# Patient Record
Sex: MALE | Race: WHITE | HISPANIC OR LATINO | Employment: FULL TIME | URBAN - NONMETROPOLITAN AREA
[De-identification: names, ages, dates, MRNs, and addresses within clinical notes are randomized per-mention and may not be internally consistent; named-entity substitution may affect disease eponyms.]

---

## 2024-06-11 ENCOUNTER — APPOINTMENT (OUTPATIENT)
Dept: RADIOLOGY | Facility: HOSPITAL | Age: 44
End: 2024-06-11

## 2024-06-11 ENCOUNTER — HOSPITAL ENCOUNTER (EMERGENCY)
Facility: HOSPITAL | Age: 44
Discharge: OTHER INSTITUTION | End: 2024-06-12
Attending: EMERGENCY MEDICINE

## 2024-06-11 ENCOUNTER — PREP FOR PROCEDURE (OUTPATIENT)
Dept: UROLOGY | Facility: HOSPITAL | Age: 44
End: 2024-06-11

## 2024-06-11 DIAGNOSIS — R10.9 FLANK PAIN: ICD-10-CM

## 2024-06-11 DIAGNOSIS — N13.8 URINARY TRACT OBSTRUCTION BY KIDNEY STONE: Primary | ICD-10-CM

## 2024-06-11 DIAGNOSIS — N30.01 ACUTE CYSTITIS WITH HEMATURIA: ICD-10-CM

## 2024-06-11 DIAGNOSIS — N20.0 URINARY TRACT OBSTRUCTION BY KIDNEY STONE: Primary | ICD-10-CM

## 2024-06-11 DIAGNOSIS — N20.1 LEFT URETERAL STONE: Primary | ICD-10-CM

## 2024-06-11 LAB
ALBUMIN SERPL BCP-MCNC: 4.2 G/DL (ref 3.4–5)
ALP SERPL-CCNC: 83 U/L (ref 33–120)
ALT SERPL W P-5'-P-CCNC: 51 U/L (ref 10–52)
ANION GAP SERPL CALC-SCNC: 11 MMOL/L (ref 10–20)
APPEARANCE UR: ABNORMAL
AST SERPL W P-5'-P-CCNC: 25 U/L (ref 9–39)
BACTERIA #/AREA URNS AUTO: ABNORMAL /HPF
BASOPHILS # BLD AUTO: 0.04 X10*3/UL (ref 0–0.1)
BASOPHILS NFR BLD AUTO: 0.4 %
BILIRUB SERPL-MCNC: 0.8 MG/DL (ref 0–1.2)
BILIRUB UR STRIP.AUTO-MCNC: NEGATIVE MG/DL
BUN SERPL-MCNC: 16 MG/DL (ref 6–23)
CALCIUM SERPL-MCNC: 9.8 MG/DL (ref 8.6–10.3)
CHLORIDE SERPL-SCNC: 104 MMOL/L (ref 98–107)
CO2 SERPL-SCNC: 28 MMOL/L (ref 21–32)
COLOR UR: ABNORMAL
CREAT SERPL-MCNC: 1.22 MG/DL (ref 0.5–1.3)
EGFRCR SERPLBLD CKD-EPI 2021: 76 ML/MIN/1.73M*2
EOSINOPHIL # BLD AUTO: 0.03 X10*3/UL (ref 0–0.7)
EOSINOPHIL NFR BLD AUTO: 0.3 %
ERYTHROCYTE [DISTWIDTH] IN BLOOD BY AUTOMATED COUNT: 12.6 % (ref 11.5–14.5)
GLUCOSE SERPL-MCNC: 124 MG/DL (ref 74–99)
GLUCOSE UR STRIP.AUTO-MCNC: NORMAL MG/DL
HCT VFR BLD AUTO: 43 % (ref 41–52)
HGB BLD-MCNC: 14.2 G/DL (ref 13.5–17.5)
IMM GRANULOCYTES # BLD AUTO: 0.03 X10*3/UL (ref 0–0.7)
IMM GRANULOCYTES NFR BLD AUTO: 0.3 % (ref 0–0.9)
KETONES UR STRIP.AUTO-MCNC: ABNORMAL MG/DL
LACTATE SERPL-SCNC: 0.9 MMOL/L (ref 0.4–2)
LEUKOCYTE ESTERASE UR QL STRIP.AUTO: ABNORMAL
LYMPHOCYTES # BLD AUTO: 0.94 X10*3/UL (ref 1.2–4.8)
LYMPHOCYTES NFR BLD AUTO: 9.6 %
MCH RBC QN AUTO: 29.8 PG (ref 26–34)
MCHC RBC AUTO-ENTMCNC: 33 G/DL (ref 32–36)
MCV RBC AUTO: 90 FL (ref 80–100)
MONOCYTES # BLD AUTO: 0.56 X10*3/UL (ref 0.1–1)
MONOCYTES NFR BLD AUTO: 5.7 %
MUCOUS THREADS #/AREA URNS AUTO: ABNORMAL /LPF
NEUTROPHILS # BLD AUTO: 8.2 X10*3/UL (ref 1.2–7.7)
NEUTROPHILS NFR BLD AUTO: 83.7 %
NITRITE UR QL STRIP.AUTO: NEGATIVE
NRBC BLD-RTO: 0 /100 WBCS (ref 0–0)
PH UR STRIP.AUTO: 6.5 [PH]
PLATELET # BLD AUTO: 175 X10*3/UL (ref 150–450)
POTASSIUM SERPL-SCNC: 3.3 MMOL/L (ref 3.5–5.3)
PROT SERPL-MCNC: 7.7 G/DL (ref 6.4–8.2)
PROT UR STRIP.AUTO-MCNC: ABNORMAL MG/DL
RBC # BLD AUTO: 4.77 X10*6/UL (ref 4.5–5.9)
RBC # UR STRIP.AUTO: ABNORMAL /UL
RBC #/AREA URNS AUTO: >20 /HPF
SODIUM SERPL-SCNC: 140 MMOL/L (ref 136–145)
SP GR UR STRIP.AUTO: 1.03
SQUAMOUS #/AREA URNS AUTO: ABNORMAL /HPF
UROBILINOGEN UR STRIP.AUTO-MCNC: ABNORMAL MG/DL
WBC # BLD AUTO: 9.8 X10*3/UL (ref 4.4–11.3)
WBC #/AREA URNS AUTO: >50 /HPF

## 2024-06-11 PROCEDURE — 99284 EMERGENCY DEPT VISIT MOD MDM: CPT | Mod: 25

## 2024-06-11 PROCEDURE — 96372 THER/PROPH/DIAG INJ SC/IM: CPT | Performed by: EMERGENCY MEDICINE

## 2024-06-11 PROCEDURE — 2550000001 HC RX 255 CONTRASTS: Performed by: PHYSICIAN ASSISTANT

## 2024-06-11 PROCEDURE — 85025 COMPLETE CBC W/AUTO DIFF WBC: CPT | Performed by: PHYSICIAN ASSISTANT

## 2024-06-11 PROCEDURE — 36415 COLL VENOUS BLD VENIPUNCTURE: CPT | Performed by: PHYSICIAN ASSISTANT

## 2024-06-11 PROCEDURE — 96365 THER/PROPH/DIAG IV INF INIT: CPT

## 2024-06-11 PROCEDURE — 74177 CT ABD & PELVIS W/CONTRAST: CPT

## 2024-06-11 PROCEDURE — 87040 BLOOD CULTURE FOR BACTERIA: CPT | Mod: GENLAB | Performed by: PHYSICIAN ASSISTANT

## 2024-06-11 PROCEDURE — 87186 SC STD MICRODIL/AGAR DIL: CPT | Mod: GENLAB | Performed by: EMERGENCY MEDICINE

## 2024-06-11 PROCEDURE — 2500000004 HC RX 250 GENERAL PHARMACY W/ HCPCS (ALT 636 FOR OP/ED): Performed by: EMERGENCY MEDICINE

## 2024-06-11 PROCEDURE — 83605 ASSAY OF LACTIC ACID: CPT | Performed by: PHYSICIAN ASSISTANT

## 2024-06-11 PROCEDURE — 81003 URINALYSIS AUTO W/O SCOPE: CPT | Performed by: EMERGENCY MEDICINE

## 2024-06-11 PROCEDURE — 74177 CT ABD & PELVIS W/CONTRAST: CPT | Performed by: STUDENT IN AN ORGANIZED HEALTH CARE EDUCATION/TRAINING PROGRAM

## 2024-06-11 PROCEDURE — 2500000004 HC RX 250 GENERAL PHARMACY W/ HCPCS (ALT 636 FOR OP/ED): Performed by: PHYSICIAN ASSISTANT

## 2024-06-11 PROCEDURE — 2500000002 HC RX 250 W HCPCS SELF ADMINISTERED DRUGS (ALT 637 FOR MEDICARE OP, ALT 636 FOR OP/ED): Performed by: PHYSICIAN ASSISTANT

## 2024-06-11 PROCEDURE — 80053 COMPREHEN METABOLIC PANEL: CPT | Performed by: PHYSICIAN ASSISTANT

## 2024-06-11 PROCEDURE — 99285 EMERGENCY DEPT VISIT HI MDM: CPT | Mod: 25

## 2024-06-11 RX ORDER — POTASSIUM CHLORIDE 20 MEQ/1
40 TABLET, EXTENDED RELEASE ORAL ONCE
Status: COMPLETED | OUTPATIENT
Start: 2024-06-11 | End: 2024-06-11

## 2024-06-11 RX ORDER — CEFTRIAXONE 1 G/50ML
1 INJECTION, SOLUTION INTRAVENOUS ONCE
Status: COMPLETED | OUTPATIENT
Start: 2024-06-11 | End: 2024-06-11

## 2024-06-11 RX ORDER — TAMSULOSIN HYDROCHLORIDE 0.4 MG/1
0.4 CAPSULE ORAL DAILY
Status: DISCONTINUED | OUTPATIENT
Start: 2024-06-11 | End: 2024-06-12 | Stop reason: HOSPADM

## 2024-06-11 RX ORDER — KETOROLAC TROMETHAMINE 30 MG/ML
30 INJECTION, SOLUTION INTRAMUSCULAR; INTRAVENOUS ONCE
Status: COMPLETED | OUTPATIENT
Start: 2024-06-11 | End: 2024-06-11

## 2024-06-11 ASSESSMENT — COLUMBIA-SUICIDE SEVERITY RATING SCALE - C-SSRS
1. IN THE PAST MONTH, HAVE YOU WISHED YOU WERE DEAD OR WISHED YOU COULD GO TO SLEEP AND NOT WAKE UP?: NO
6. HAVE YOU EVER DONE ANYTHING, STARTED TO DO ANYTHING, OR PREPARED TO DO ANYTHING TO END YOUR LIFE?: NO
2. HAVE YOU ACTUALLY HAD ANY THOUGHTS OF KILLING YOURSELF?: NO

## 2024-06-11 ASSESSMENT — PAIN - FUNCTIONAL ASSESSMENT
PAIN_FUNCTIONAL_ASSESSMENT: 0-10
PAIN_FUNCTIONAL_ASSESSMENT: 0-10

## 2024-06-11 ASSESSMENT — PAIN SCALES - GENERAL
PAINLEVEL_OUTOF10: 3
PAINLEVEL_OUTOF10: 5 - MODERATE PAIN

## 2024-06-11 NOTE — ED PROVIDER NOTES
HPI   Chief Complaint   Patient presents with    Fever     Fever x2 days and pain with a small amount of blood in urine.        History of present illness:  43-year-old male presents emergency room for complaints of left-sided flank pain blood in his urine burning urination and fever x 2 days.  Patient states he has history of kidney stones and states that he has not had nausea or vomiting but states he began having some pain in his left lower flank couple days ago and he states that beginning about 24 hours ago he began having burning urination and some blood in his urine.  He states he has also been having fevers and bodyaches and chills.  He does not take any daily medications and has no other medical history.  Denies any other symptoms at this time.  In particular he denies any abdominal pain or change in bowel habits or diarrhea.    Social history: Negative for alcohol and drug use.    Review of systems:   Gen.: No weight loss, fatigue, anorexia, insomnia  Eyes: No vision loss, double vision, drainage, eye pain.   ENT: No pharyngitis, dry mouth.   Cardiac: No chest pain, palpitations, syncope, near syncope.   Pulmonary: No shortness of breath, cough, hemoptysis.   Heme/lymph: No swollen glands, bleeding.   GI: No abdominal pain, change in bowel habits, melena, hematemesis, hematochezia, nausea, vomiting, diarrhea.   : No discharge,  frequency, urgency, hematuria.   Musculoskeletal: No limb pain, joint pain, joint swelling.   Skin: No rashes.   Review of systems is otherwise negative unless stated above or in history of present illness.      Physical exam:  General: Vitals noted, no distress. Afebrile.   EENT: No lymphadenopathy appreciated  Cardiac: Regular, rate, rhythm, no murmur.   Pulmonary: Lungs clear bilaterally with good aeration. No adventitious breath sounds.   Abdomen: Soft, nonsurgical. Nontender. No peritoneal signs. Normoactive bowel sounds.   Extremities: No peripheral edema.   Skin: No rash.    Neuro: No focal neurologic deficits        Medical decision making:   Testing: CBC CMP lactate, urinalysis CT scan abdomen pelvis with contrast: Urinalysis shows concerns for UTI CT scan of the abdomen pelvis shows 11 mm obstructing stone with hydronephrosis in the left UVJ, there is a small or 3 mm stone lodged in the proximal right ureter, all imaging interpreted by radiology  Treatment: IV fluids given  Reevaluation: Toradol IV given, Flomax p.o. given  Plan: 43-year-old male presents emergency room for complaints of left-sided flank pain blood in his urine burning urination and fever x 2 days.  Patient states he has history of kidney stones and states that he has not had nausea or vomiting but states he began having some pain in his left lower flank couple days ago and he states that beginning about 24 hours ago he began having burning urination and some blood in his urine.  He states he has also been having fevers and bodyaches and chills.  He does not take any daily medications and has no other medical history.  Denies any other symptoms at this time.  In particular he denies any abdominal pain or change in bowel habits or diarrhea.  No fever appreciated on exam lungs are to auscultation normal S1-S2 heart sounds no pain to palpation across abdomen negative CVA tenderness.  Vitals are appropriate other than some hypertension.  I explained to the patient test results and as noted above the patient was medicated.  The patient was also started on Rocephin once the UTI had been discovered.  Blood cultures and urine cultures were also gathered.  CT scan showed concerns for obstructing stone.  I spoke with Dr. Horne of urology at [facility] who requested the patient be transferred over to the facility that he would take him to the OR in the morning to perform lithotripsy.  I spoke with the hospitalist team who accepted the patient at this time.  Impression:   1.  Kidney stone  2.   UTI          History provided by:  Patient   used: No                        Isabel Coma Scale Score: 15                     Patient History   History reviewed. No pertinent past medical history.  History reviewed. No pertinent surgical history.  No family history on file.  Social History     Tobacco Use    Smoking status: Not on file    Smokeless tobacco: Not on file   Substance Use Topics    Alcohol use: Not on file    Drug use: Not on file       Physical Exam   ED Triage Vitals [06/11/24 1537]   Temperature Heart Rate Respirations BP   36.8 °C (98.3 °F) 91 14 (!) 141/99      Pulse Ox Temp Source Heart Rate Source Patient Position   99 % Tympanic -- --      BP Location FiO2 (%)     Left arm --       Physical Exam    ED Course & MDM   Diagnoses as of 06/11/24 2144   Urinary tract obstruction by kidney stone   Acute cystitis with hematuria   Flank pain       Medical Decision Making      Procedure  Procedures     Ayush Morris PA-C  06/11/24 2140

## 2024-06-12 ENCOUNTER — HOSPITAL ENCOUNTER (INPATIENT)
Facility: HOSPITAL | Age: 44
LOS: 1 days | Discharge: HOME | End: 2024-06-13
Attending: INTERNAL MEDICINE | Admitting: INTERNAL MEDICINE

## 2024-06-12 ENCOUNTER — ANESTHESIA (OUTPATIENT)
Dept: OPERATING ROOM | Facility: HOSPITAL | Age: 44
End: 2024-06-12

## 2024-06-12 ENCOUNTER — ANESTHESIA EVENT (OUTPATIENT)
Dept: OPERATING ROOM | Facility: HOSPITAL | Age: 44
End: 2024-06-12

## 2024-06-12 ENCOUNTER — APPOINTMENT (OUTPATIENT)
Dept: RADIOLOGY | Facility: HOSPITAL | Age: 44
End: 2024-06-12

## 2024-06-12 VITALS
BODY MASS INDEX: 30.31 KG/M2 | SYSTOLIC BLOOD PRESSURE: 148 MMHG | TEMPERATURE: 98.3 F | RESPIRATION RATE: 16 BRPM | OXYGEN SATURATION: 98 % | DIASTOLIC BLOOD PRESSURE: 90 MMHG | HEART RATE: 84 BPM | WEIGHT: 200 LBS | HEIGHT: 68 IN

## 2024-06-12 DIAGNOSIS — N20.0 LEFT NEPHROLITHIASIS: ICD-10-CM

## 2024-06-12 DIAGNOSIS — N20.1 LEFT URETERAL STONE: Primary | ICD-10-CM

## 2024-06-12 LAB
ALBUMIN SERPL BCP-MCNC: 4 G/DL (ref 3.4–5)
ANION GAP SERPL CALC-SCNC: 15 MMOL/L (ref 10–20)
BUN SERPL-MCNC: 11 MG/DL (ref 6–23)
CALCIUM SERPL-MCNC: 8.8 MG/DL (ref 8.6–10.3)
CHLORIDE SERPL-SCNC: 105 MMOL/L (ref 98–107)
CO2 SERPL-SCNC: 24 MMOL/L (ref 21–32)
CREAT SERPL-MCNC: 1.16 MG/DL (ref 0.5–1.3)
EGFRCR SERPLBLD CKD-EPI 2021: 81 ML/MIN/1.73M*2
ERYTHROCYTE [DISTWIDTH] IN BLOOD BY AUTOMATED COUNT: 12.4 % (ref 11.5–14.5)
GLUCOSE SERPL-MCNC: 119 MG/DL (ref 74–99)
HCT VFR BLD AUTO: 42.3 % (ref 41–52)
HGB BLD-MCNC: 13.8 G/DL (ref 13.5–17.5)
HOLD SPECIMEN: NORMAL
MAGNESIUM SERPL-MCNC: 1.84 MG/DL (ref 1.6–2.4)
MCH RBC QN AUTO: 29.6 PG (ref 26–34)
MCHC RBC AUTO-ENTMCNC: 32.6 G/DL (ref 32–36)
MCV RBC AUTO: 91 FL (ref 80–100)
NRBC BLD-RTO: 0 /100 WBCS (ref 0–0)
PHOSPHATE SERPL-MCNC: 3 MG/DL (ref 2.5–4.9)
PLATELET # BLD AUTO: 143 X10*3/UL (ref 150–450)
POTASSIUM SERPL-SCNC: 4.1 MMOL/L (ref 3.5–5.3)
RBC # BLD AUTO: 4.67 X10*6/UL (ref 4.5–5.9)
SODIUM SERPL-SCNC: 140 MMOL/L (ref 136–145)
WBC # BLD AUTO: 5.6 X10*3/UL (ref 4.4–11.3)

## 2024-06-12 PROCEDURE — 2550000001 HC RX 255 CONTRASTS: Performed by: STUDENT IN AN ORGANIZED HEALTH CARE EDUCATION/TRAINING PROGRAM

## 2024-06-12 PROCEDURE — 3700000001 HC GENERAL ANESTHESIA TIME - INITIAL BASE CHARGE: Performed by: STUDENT IN AN ORGANIZED HEALTH CARE EDUCATION/TRAINING PROGRAM

## 2024-06-12 PROCEDURE — 2500000004 HC RX 250 GENERAL PHARMACY W/ HCPCS (ALT 636 FOR OP/ED): Performed by: STUDENT IN AN ORGANIZED HEALTH CARE EDUCATION/TRAINING PROGRAM

## 2024-06-12 PROCEDURE — 74420 UROGRAPHY RTRGR +-KUB: CPT | Performed by: RADIOLOGY

## 2024-06-12 PROCEDURE — 2500000004 HC RX 250 GENERAL PHARMACY W/ HCPCS (ALT 636 FOR OP/ED)

## 2024-06-12 PROCEDURE — 80069 RENAL FUNCTION PANEL: CPT

## 2024-06-12 PROCEDURE — 74420 UROGRAPHY RTRGR +-KUB: CPT | Performed by: STUDENT IN AN ORGANIZED HEALTH CARE EDUCATION/TRAINING PROGRAM

## 2024-06-12 PROCEDURE — 83735 ASSAY OF MAGNESIUM: CPT

## 2024-06-12 PROCEDURE — 52332 CYSTOSCOPY AND TREATMENT: CPT | Performed by: STUDENT IN AN ORGANIZED HEALTH CARE EDUCATION/TRAINING PROGRAM

## 2024-06-12 PROCEDURE — C2617 STENT, NON-COR, TEM W/O DEL: HCPCS | Performed by: STUDENT IN AN ORGANIZED HEALTH CARE EDUCATION/TRAINING PROGRAM

## 2024-06-12 PROCEDURE — 0T778DZ DILATION OF LEFT URETER WITH INTRALUMINAL DEVICE, VIA NATURAL OR ARTIFICIAL OPENING ENDOSCOPIC: ICD-10-PCS | Performed by: STUDENT IN AN ORGANIZED HEALTH CARE EDUCATION/TRAINING PROGRAM

## 2024-06-12 PROCEDURE — 2720000007 HC OR 272 NO HCPCS: Performed by: STUDENT IN AN ORGANIZED HEALTH CARE EDUCATION/TRAINING PROGRAM

## 2024-06-12 PROCEDURE — 3700000002 HC GENERAL ANESTHESIA TIME - EACH INCREMENTAL 1 MINUTE: Performed by: STUDENT IN AN ORGANIZED HEALTH CARE EDUCATION/TRAINING PROGRAM

## 2024-06-12 PROCEDURE — 2500000001 HC RX 250 WO HCPCS SELF ADMINISTERED DRUGS (ALT 637 FOR MEDICARE OP)

## 2024-06-12 PROCEDURE — 99222 1ST HOSP IP/OBS MODERATE 55: CPT

## 2024-06-12 PROCEDURE — 2780000003 HC OR 278 NO HCPCS: Performed by: STUDENT IN AN ORGANIZED HEALTH CARE EDUCATION/TRAINING PROGRAM

## 2024-06-12 PROCEDURE — 1100000001 HC PRIVATE ROOM DAILY

## 2024-06-12 PROCEDURE — 7100000002 HC RECOVERY ROOM TIME - EACH INCREMENTAL 1 MINUTE: Performed by: STUDENT IN AN ORGANIZED HEALTH CARE EDUCATION/TRAINING PROGRAM

## 2024-06-12 PROCEDURE — 99222 1ST HOSP IP/OBS MODERATE 55: CPT | Performed by: STUDENT IN AN ORGANIZED HEALTH CARE EDUCATION/TRAINING PROGRAM

## 2024-06-12 PROCEDURE — 85027 COMPLETE CBC AUTOMATED: CPT

## 2024-06-12 PROCEDURE — 36415 COLL VENOUS BLD VENIPUNCTURE: CPT

## 2024-06-12 PROCEDURE — 3600000003 HC OR TIME - INITIAL BASE CHARGE - PROCEDURE LEVEL THREE: Performed by: STUDENT IN AN ORGANIZED HEALTH CARE EDUCATION/TRAINING PROGRAM

## 2024-06-12 PROCEDURE — 7100000001 HC RECOVERY ROOM TIME - INITIAL BASE CHARGE: Performed by: STUDENT IN AN ORGANIZED HEALTH CARE EDUCATION/TRAINING PROGRAM

## 2024-06-12 PROCEDURE — 74420 UROGRAPHY RTRGR +-KUB: CPT

## 2024-06-12 PROCEDURE — BT171ZZ FLUOROSCOPY OF LEFT URETER USING LOW OSMOLAR CONTRAST: ICD-10-PCS | Performed by: STUDENT IN AN ORGANIZED HEALTH CARE EDUCATION/TRAINING PROGRAM

## 2024-06-12 PROCEDURE — 2500000004 HC RX 250 GENERAL PHARMACY W/ HCPCS (ALT 636 FOR OP/ED): Performed by: NURSE ANESTHETIST, CERTIFIED REGISTERED

## 2024-06-12 PROCEDURE — 3600000008 HC OR TIME - EACH INCREMENTAL 1 MINUTE - PROCEDURE LEVEL THREE: Performed by: STUDENT IN AN ORGANIZED HEALTH CARE EDUCATION/TRAINING PROGRAM

## 2024-06-12 DEVICE — IMAJIN™ DOUBLE LOOP URETERAL STENT SILICONE HYDRO-COATED OPEN/OPEN CH FR 06 LENGTH 26 CM WITH STEERABLE PUSHER
Type: IMPLANTABLE DEVICE | Site: URETER | Status: FUNCTIONAL
Brand: PORGES COLOPLAST

## 2024-06-12 RX ORDER — DROPERIDOL 2.5 MG/ML
0.62 INJECTION, SOLUTION INTRAMUSCULAR; INTRAVENOUS ONCE AS NEEDED
Status: CANCELLED | OUTPATIENT
Start: 2024-06-12

## 2024-06-12 RX ORDER — CEFTRIAXONE 1 G/50ML
1 INJECTION, SOLUTION INTRAVENOUS EVERY 24 HOURS
Status: DISCONTINUED | OUTPATIENT
Start: 2024-06-12 | End: 2024-06-13 | Stop reason: HOSPADM

## 2024-06-12 RX ORDER — SODIUM CHLORIDE 9 MG/ML
100 INJECTION, SOLUTION INTRAVENOUS CONTINUOUS
Status: DISCONTINUED | OUTPATIENT
Start: 2024-06-12 | End: 2024-06-13 | Stop reason: HOSPADM

## 2024-06-12 RX ORDER — MIDAZOLAM HYDROCHLORIDE 1 MG/ML
INJECTION INTRAMUSCULAR; INTRAVENOUS AS NEEDED
Status: DISCONTINUED | OUTPATIENT
Start: 2024-06-12 | End: 2024-06-12

## 2024-06-12 RX ORDER — OXYCODONE HYDROCHLORIDE 5 MG/1
5 TABLET ORAL EVERY 4 HOURS PRN
Status: CANCELLED | OUTPATIENT
Start: 2024-06-12

## 2024-06-12 RX ORDER — POLYETHYLENE GLYCOL 3350 17 G/17G
17 POWDER, FOR SOLUTION ORAL DAILY
Status: DISCONTINUED | OUTPATIENT
Start: 2024-06-13 | End: 2024-06-13 | Stop reason: HOSPADM

## 2024-06-12 RX ORDER — PROPOFOL 10 MG/ML
INJECTION, EMULSION INTRAVENOUS AS NEEDED
Status: DISCONTINUED | OUTPATIENT
Start: 2024-06-12 | End: 2024-06-12

## 2024-06-12 RX ORDER — IBUPROFEN 600 MG/1
600 TABLET ORAL EVERY 6 HOURS PRN
Status: DISCONTINUED | OUTPATIENT
Start: 2024-06-12 | End: 2024-06-13 | Stop reason: HOSPADM

## 2024-06-12 RX ORDER — TAMSULOSIN HYDROCHLORIDE 0.4 MG/1
0.4 CAPSULE ORAL DAILY
Status: DISCONTINUED | OUTPATIENT
Start: 2024-06-12 | End: 2024-06-13 | Stop reason: HOSPADM

## 2024-06-12 RX ORDER — AMOXICILLIN 250 MG
1 CAPSULE ORAL NIGHTLY
Status: DISCONTINUED | OUTPATIENT
Start: 2024-06-13 | End: 2024-06-13 | Stop reason: HOSPADM

## 2024-06-12 RX ORDER — FENTANYL CITRATE 50 UG/ML
INJECTION, SOLUTION INTRAMUSCULAR; INTRAVENOUS AS NEEDED
Status: DISCONTINUED | OUTPATIENT
Start: 2024-06-12 | End: 2024-06-12

## 2024-06-12 RX ORDER — ONDANSETRON HYDROCHLORIDE 2 MG/ML
4 INJECTION, SOLUTION INTRAVENOUS ONCE AS NEEDED
Status: CANCELLED | OUTPATIENT
Start: 2024-06-12

## 2024-06-12 RX ORDER — SODIUM CHLORIDE, SODIUM LACTATE, POTASSIUM CHLORIDE, CALCIUM CHLORIDE 600; 310; 30; 20 MG/100ML; MG/100ML; MG/100ML; MG/100ML
100 INJECTION, SOLUTION INTRAVENOUS CONTINUOUS
Status: CANCELLED | OUTPATIENT
Start: 2024-06-12

## 2024-06-12 RX ORDER — POTASSIUM CHLORIDE 14.9 MG/ML
20 INJECTION INTRAVENOUS
Status: COMPLETED | OUTPATIENT
Start: 2024-06-12 | End: 2024-06-12

## 2024-06-12 SDOH — SOCIAL STABILITY: SOCIAL INSECURITY: ABUSE: ADULT

## 2024-06-12 SDOH — HEALTH STABILITY: MENTAL HEALTH: CURRENT SMOKER: 0

## 2024-06-12 SDOH — SOCIAL STABILITY: SOCIAL INSECURITY: ARE THERE ANY APPARENT SIGNS OF INJURIES/BEHAVIORS THAT COULD BE RELATED TO ABUSE/NEGLECT?: NO

## 2024-06-12 SDOH — SOCIAL STABILITY: SOCIAL INSECURITY: HAS ANYONE EVER THREATENED TO HURT YOUR FAMILY OR YOUR PETS?: NO

## 2024-06-12 SDOH — SOCIAL STABILITY: SOCIAL INSECURITY: DOES ANYONE TRY TO KEEP YOU FROM HAVING/CONTACTING OTHER FRIENDS OR DOING THINGS OUTSIDE YOUR HOME?: NO

## 2024-06-12 SDOH — SOCIAL STABILITY: SOCIAL INSECURITY: DO YOU FEEL ANYONE HAS EXPLOITED OR TAKEN ADVANTAGE OF YOU FINANCIALLY OR OF YOUR PERSONAL PROPERTY?: NO

## 2024-06-12 SDOH — SOCIAL STABILITY: SOCIAL INSECURITY: HAVE YOU HAD ANY THOUGHTS OF HARMING ANYONE ELSE?: NO

## 2024-06-12 SDOH — SOCIAL STABILITY: SOCIAL INSECURITY: ARE YOU OR HAVE YOU BEEN THREATENED OR ABUSED PHYSICALLY, EMOTIONALLY, OR SEXUALLY BY ANYONE?: NO

## 2024-06-12 SDOH — SOCIAL STABILITY: SOCIAL INSECURITY: WERE YOU ABLE TO COMPLETE ALL THE BEHAVIORAL HEALTH SCREENINGS?: YES

## 2024-06-12 SDOH — SOCIAL STABILITY: SOCIAL INSECURITY: HAVE YOU HAD THOUGHTS OF HARMING ANYONE ELSE?: NO

## 2024-06-12 SDOH — SOCIAL STABILITY: SOCIAL INSECURITY: DO YOU FEEL UNSAFE GOING BACK TO THE PLACE WHERE YOU ARE LIVING?: NO

## 2024-06-12 ASSESSMENT — ENCOUNTER SYMPTOMS
CARDIOVASCULAR NEGATIVE: 1
FLANK PAIN: 0
HEADACHES: 1
DYSURIA: 1
MUSCULOSKELETAL NEGATIVE: 1
GASTROINTESTINAL NEGATIVE: 1
FEVER: 1
CHILLS: 0
HEMATURIA: 1
RESPIRATORY NEGATIVE: 1

## 2024-06-12 ASSESSMENT — PAIN SCALES - GENERAL
PAIN_LEVEL: 1
PAINLEVEL_OUTOF10: 0 - NO PAIN
PAINLEVEL_OUTOF10: 1
PAINLEVEL_OUTOF10: 0 - NO PAIN
PAINLEVEL_OUTOF10: 0 - NO PAIN
PAINLEVEL_OUTOF10: 1
PAINLEVEL_OUTOF10: 3

## 2024-06-12 ASSESSMENT — LIFESTYLE VARIABLES
HOW OFTEN DURING THE LAST YEAR HAVE YOU FAILED TO DO WHAT WAS NORMALLY EXPECTED FROM YOU BECAUSE OF DRINKING: NEVER
HOW OFTEN DURING THE LAST YEAR HAVE YOU NEEDED AN ALCOHOLIC DRINK FIRST THING IN THE MORNING TO GET YOURSELF GOING AFTER A NIGHT OF HEAVY DRINKING: NEVER
HOW MANY STANDARD DRINKS CONTAINING ALCOHOL DO YOU HAVE ON A TYPICAL DAY: 1 OR 2
AUDIT TOTAL SCORE: 0
HOW OFTEN DURING THE LAST YEAR HAVE YOU HAD A FEELING OF GUILT OR REMORSE AFTER DRINKING: NEVER
HAS A RELATIVE, FRIEND, DOCTOR, OR ANOTHER HEALTH PROFESSIONAL EXPRESSED CONCERN ABOUT YOUR DRINKING OR SUGGESTED YOU CUT DOWN: NO
AUDIT-C TOTAL SCORE: 3
AUDIT TOTAL SCORE: 3
HOW OFTEN DURING THE LAST YEAR HAVE YOU BEEN UNABLE TO REMEMBER WHAT HAPPENED THE NIGHT BEFORE BECAUSE YOU HAD BEEN DRINKING: NEVER
HAVE YOU OR SOMEONE ELSE BEEN INJURED AS A RESULT OF YOUR DRINKING: NO
HOW OFTEN DURING THE LAST YEAR HAVE YOU FOUND THAT YOU WERE NOT ABLE TO STOP DRINKING ONCE YOU HAD STARTED: NEVER
HOW OFTEN DO YOU HAVE 6 OR MORE DRINKS ON ONE OCCASION: NEVER
HOW OFTEN DO YOU HAVE A DRINK CONTAINING ALCOHOL: 2-3 TIMES A WEEK
AUDIT-C TOTAL SCORE: 3
SKIP TO QUESTIONS 9-10: 1

## 2024-06-12 ASSESSMENT — COGNITIVE AND FUNCTIONAL STATUS - GENERAL
DAILY ACTIVITIY SCORE: 24
MOBILITY SCORE: 24
DAILY ACTIVITIY SCORE: 24
PATIENT BASELINE BEDBOUND: NO
MOBILITY SCORE: 24

## 2024-06-12 ASSESSMENT — PAIN - FUNCTIONAL ASSESSMENT
PAIN_FUNCTIONAL_ASSESSMENT: 0-10
PAIN_FUNCTIONAL_ASSESSMENT: 0-10

## 2024-06-12 ASSESSMENT — PATIENT HEALTH QUESTIONNAIRE - PHQ9
SUM OF ALL RESPONSES TO PHQ9 QUESTIONS 1 & 2: 0
2. FEELING DOWN, DEPRESSED OR HOPELESS: NOT AT ALL
1. LITTLE INTEREST OR PLEASURE IN DOING THINGS: NOT AT ALL

## 2024-06-12 ASSESSMENT — ACTIVITIES OF DAILY LIVING (ADL)
FEEDING YOURSELF: INDEPENDENT
ADEQUATE_TO_COMPLETE_ADL: YES
GROOMING: INDEPENDENT
DRESSING YOURSELF: INDEPENDENT
HEARING - RIGHT EAR: FUNCTIONAL
WALKS IN HOME: INDEPENDENT
BATHING: INDEPENDENT
PATIENT'S MEMORY ADEQUATE TO SAFELY COMPLETE DAILY ACTIVITIES?: YES
JUDGMENT_ADEQUATE_SAFELY_COMPLETE_DAILY_ACTIVITIES: YES
TOILETING: INDEPENDENT
HEARING - LEFT EAR: FUNCTIONAL
LACK_OF_TRANSPORTATION: PATIENT UNABLE TO ANSWER

## 2024-06-12 ASSESSMENT — COLUMBIA-SUICIDE SEVERITY RATING SCALE - C-SSRS
2. HAVE YOU ACTUALLY HAD ANY THOUGHTS OF KILLING YOURSELF?: NO
6. HAVE YOU EVER DONE ANYTHING, STARTED TO DO ANYTHING, OR PREPARED TO DO ANYTHING TO END YOUR LIFE?: NO
1. IN THE PAST MONTH, HAVE YOU WISHED YOU WERE DEAD OR WISHED YOU COULD GO TO SLEEP AND NOT WAKE UP?: NO

## 2024-06-12 ASSESSMENT — PAIN DESCRIPTION - DESCRIPTORS: DESCRIPTORS: ACHING

## 2024-06-12 NOTE — ANESTHESIA POSTPROCEDURE EVALUATION
Patient: Mikael Rodríguez    Procedure Summary       Date: 06/12/24 Room / Location: [facility] OR [facility] / [facility]    Anesthesia Start: 1646 Anesthesia Stop: 1727    Procedure: CYSTOSCOPY WITH STENT PLACEMENT (Left) Diagnosis:       Left ureteral stone      (Left ureteral stone [N20.1])    Surgeons: Sherwin Segal MD Responsible Provider: MARTINE Garcia    Anesthesia Type: general ASA Status: 2            Anesthesia Type: general    Vitals Value Taken Time   BP 97/56 06/12/24 1735   Temp 36.4 °C (97.5 °F) 06/12/24 1722   Pulse 77 06/12/24 1749   Resp 16 06/12/24 1722   SpO2 96 % 06/12/24 1749   Vitals shown include unfiled device data.    Anesthesia Post Evaluation    Patient location during evaluation: PACU  Patient participation: complete - patient participated  Level of consciousness: awake  Pain score: 1  Pain management: adequate  Multimodal analgesia pain management approach  Airway patency: patent  Cardiovascular status: acceptable  Respiratory status: acceptable  Hydration status: acceptable  Postoperative Nausea and Vomiting: none        No notable events documented.

## 2024-06-12 NOTE — SIGNIFICANT EVENT
Mikael Rodríguez is a 43 y.o. male admitted for left sided obstructive nephrolithiasis.     Acute Medical Issues   #Left sided obstructive nephrolithiasis  #UTI  - Patient presented to the ED with a 2 day history of fever, headache, and yesterday with dysuria and hematuria  - CT scan imaging showing 11 mm left UVJ obstructing calculus with mild hydroureteronephrosis  - UA showing signs of UTI. Pending urine cultures  - Pending blood cultures  - S/p ceftriaxone, toradol, and 1L NS bolus from ED  - Continue ceftriaxone (6/11 - )  - Ibuprofen 600 mg q6h PRN for pain and fever control  - Start maintenance NS @ 100 mL/hr  - Tamsulosin 0.4 mg daily  - NPO except sips of water with meds, tentative plan for cystoscopy with ureteral stent insertion tomorrow  - Urology consulted, appreciate recs  -6/11 update: N.p.o. for the OR for cystoscopy with stent placement at 4:30 PM today.  Patient will be continued on Flomax and Rocephin pending urine culture.     #Hypokalemia  - K+ 3.3, repleted  - Continue to monitor with daily RFPs and replete as needed     Chronic Medical Issues   None     F: NS @ 100 mL/hr  E: Replete PRN  N: NPO  GI ppx: None  DVT ppx: Holding in the setting of planned procedure  Antibiotics: Ceftriaxone (6/11 - )  Tubes/Lines/Drains: PIVs     Code Status: Full Code  Emergency Contact: No emergency contact information on file.      Disposition: 43 y.o.male admitted for left kidney stone management with anticipated cystoscopy and stent placement today and discharge tomorrow.

## 2024-06-12 NOTE — ANESTHESIA PROCEDURE NOTES
Airway  Date/Time: 6/12/2024 4:52 PM  Urgency: elective    Airway not difficult    Staffing  Performed: CRNA   Authorized by: Cole Swan MD    Performed by: TERRI Garcia-TALIB  Patient location during procedure: OR    Indications and Patient Condition  Indications for airway management: anesthesia  Spontaneous Ventilation: absent  Sedation level: deep  Preoxygenated: yes  Mask difficulty assessment: 0 - not attempted    Final Airway Details  Final airway type: supraglottic airway      Successful airway: Supraglottic airway: iGel.  Size 4     Number of attempts at approach: 1

## 2024-06-12 NOTE — OP NOTE
CYSTOSCOPY WITH STENT PLACEMENT (L) Operative Note     Date: 2024  OR Location: [facility] OR    Name: Mikael Rodríguez, : 1980, Age: 43 y.o., MRN: [id number], Sex: male    Diagnosis  Pre-op Diagnosis     * Left ureteral stone [N20.1] Post-op Diagnosis     * Left ureteral stone [N20.1]     Procedures  CYSTOSCOPY WITH STENT PLACEMENT  91132 - TN CYSTO W/INSERT URETERAL STENT    CHG UROGRAPHY RETROGRADE WITH/WO KUB [40569]  Surgeons      * Sherwin Segal - Primary    Resident/Fellow/Other Assistant:  Surgeons and Role:  * No surgeons found with a matching role *    Procedure Summary  Anesthesia: Consult  ASA: II  Anesthesia Staff: Anesthesiologist: Cristobal Larson DO; Cole Swan MD  CRNA: TERRI Garcia-CRNA  Estimated Blood Loss: 0mL  Intra-op Medications:   Administrations occurring from 1630 to 1730 on 24:   Medication Name Total Dose   cefTRIAXone (Rocephin) IVPB 1 g 1 g              Anesthesia Record               Intraprocedure I/O Totals       None           Specimen: No specimens collected     Staff:   Circulator: Allison Handleyub Person: Eriberto         Drains and/or Catheters: * None in log *    Tourniquet Times:         Implants:  Implants       Type Name Action Serial No.      Implant STENT KIT, IMAJIN HYDRO, 6FR X 26CM, POSITIONER, NO GUIDEWIRE - [id number] - [id number] Implanted [id number]              Findings: radio-opaque stones in distal left ureter, severe left hydroureter and moderate hydronephrosis    Indications: Mikael Rodríguez is an 43 y.o. male who is having surgery for Left ureteral stone [N20.1]. UTI on urinalysis and recent fever prior to admission, covered with antibiotics on the floor and shown to have a 1.1cm left distal ureteral stone.    The patient was seen in the preoperative area. The risks, benefits, complications, treatment options, non-operative alternatives, expected recovery and outcomes were discussed with the patient. The possibilities of  reaction to medication, pulmonary aspiration, injury to surrounding structures, bleeding, recurrent infection, the need for additional procedures, failure to diagnose a condition, and creating a complication requiring transfusion or operation were discussed with the patient. The patient concurred with the proposed plan, giving informed consent.  The site of surgery was properly noted/marked if necessary per policy. The patient has been actively warmed in preoperative area. Preoperative antibiotics have been ordered and given within 1 hours of incision. Venous thrombosis prophylaxis have been ordered including bilateral sequential compression devices    Procedure Details: Patient was consented and antibiotics were started on call to OR.  In the operating room, under general anesthesia, with the patient in dorsolithotomy position, genitalia was prepped and draped in the usual manner.  #22 cystoscope was inserted down the urethra and bladder after dilating the urethral meatus which was tight because of glandular hypospadias, and cystoscopy revealed no stones or tumors. There was significant petechia on the bladder wall likely secondary to the infection.  The ureteral orifices were identified in the normal orthotopic position, and there was urine jet out of the left ureter. Through a ureteral catheter into the cystoscope, and an Ultratrack wire was advanced into the left ureter up to the level of the kidney. Then the ureteral catheter was advanced over the wire, which was pulled to allow for retrograde pyelogram.    Contrast was injected through the ureteral catheter visualizing the ureter and the renal pelvis.  There was severe hydroureter and moderate hydronephrosis.    Then the cystoscope was reinserted over the wire and a 6-26 double-J stent was advanced over the wire, with the proximal curl of visualized in the renal pelvis topography using fluoroscopy, while the distal curl was visualized in the bladder.    This  concluded the procedure.    Patient tolerated the procedure well and was transferred to PACU in stable condition.     Complications:  None; patient tolerated the procedure well.    Disposition: PACU - hemodynamically stable.  Condition: stable         Additional Details: second stage for stone treatment in 4 weeks    Attending Attestation: I performed the procedure.    Sherwin Segal  Phone Number: [phone]

## 2024-06-12 NOTE — LETTER
June 13, 2024     Patient: Mikael Rodríguez   YOB: 1980   Date of Visit: 6/11/2024       To Whom It May Concern:    Mikael Rodríguez in [facility] from  6/11/2024  to 6/13/2024 ?. Please excuse Mikael for his absence from work on this day to make the appointment.    If you have any questions or concerns, please don't hesitate to call.         Sincerely,     Uli Matthews MD    No name on file        CC: No Recipients   S: Patient seen and examined at bedside.  No acute overnight events.  Patient reports improvement in pain from yesterday. Reports dizziness upon standing. Admits to flatus. Voiding, ambulating, currently NPO, with nausea no vomiting. Patient denies any fever, chills, chest pain, shortness of breath, or urinary complaints.    MEDICATIONS:  acetaminophen     Tablet .. 650 milliGRAM(s) Oral every 6 hours PRN  aspirin enteric coated 81 milliGRAM(s) Oral daily  enoxaparin Injectable 40 milliGRAM(s) SubCutaneous every 24 hours  ertapenem  IVPB      ertapenem  IVPB 1000 milliGRAM(s) IV Intermittent every 24 hours  fluticasone propionate 50 MICROgram(s)/spray Nasal Spray 1 Spray(s) Both Nostrils <User Schedule>  lactated ringers. 1000 milliLiter(s) IV Continuous <Continuous>  losartan 50 milliGRAM(s) Oral daily  metoprolol succinate ER 50 milliGRAM(s) Oral daily  montelukast 10 milliGRAM(s) Oral daily  morphine  - Injectable 2 milliGRAM(s) IV Push every 4 hours PRN  morphine  - Injectable 4 milliGRAM(s) IV Push every 4 hours PRN  ondansetron Injectable 4 milliGRAM(s) IV Push every 6 hours PRN  simvastatin 20 milliGRAM(s) Oral at bedtime    O:  Vital Signs Last 24 Hrs  T(C): 37.4 (05 Apr 2023 05:16), Max: 37.7 (04 Apr 2023 22:31)  T(F): 99.4 (05 Apr 2023 05:16), Max: 99.9 (04 Apr 2023 22:31)  HR: 80 (05 Apr 2023 05:16) (70 - 80)  BP: 122/68 (05 Apr 2023 05:16) (94/55 - 142/83)  RR: 18 (05 Apr 2023 05:16) (16 - 18)  SpO2: 92% (05 Apr 2023 05:16) (92% - 98%)    Parameters below as of 05 Apr 2023 05:16  Patient On (Oxygen Delivery Method): room air    PHYSICAL EXAM:  GENERAL: No acute distress, lying comfortably in bed  HEAD:  Atraumatic, Normocephalic  CHEST/LUNG: CTAB; No wheezes, rales, or rhonchi  HEART: Regular rate and rhythm; No murmurs, rubs, or gallops  ABDOMEN: Soft, non-distended; bowel sounds+; ttp at the LLQ>RUQ, with voluntary guarding, no rebound tenderness, no peritoneal signs  EXT: calves non-tender b/l  NEUROLOGY: A&O x 3, no focal deficits    LABS:                        11.5   10.82 )-----------( 243      ( 05 Apr 2023 07:02 )             34.1     04-05    137  |  106  |  8   ----------------------------<  130<H>  3.2<L>   |  26  |  0.56    Ca    8.6      05 Apr 2023 07:02    TPro  7.2  /  Alb  3.8  /  TBili  0.5  /  DBili  x   /  AST  20  /  ALT  30  /  AlkPhos  82  04-04    PT/INR - ( 05 Apr 2023 07:02 )   PT: 15.2 sec;   INR: 1.30 ratio

## 2024-06-12 NOTE — ANESTHESIA PREPROCEDURE EVALUATION
Patient: Mikael Rodríguez    Procedure Information       Date/Time: 06/12/24 1630    Procedure: CYSTOSCOPY WITH STENT PLACEMENT (Left)    Location: [facility] / [facility]    Surgeons: Sherwin Segal MD          Vitals:    06/12/24 1445   BP: 150/89   Pulse: 101   Resp: 18   Temp: 36.4 °C (97.5 °F)   SpO2: 96%       Past Surgical History:   Procedure Laterality Date    APPENDECTOMY       Past Medical History:   Diagnosis Date    Hepatic steatosis 06/11/2024    Diffuse    Hydronephrosis of left kidney 06/11/2024    Mild    Left ureteral stone 06/11/2024    11 mm left UVJ obstructing calculus       Current Facility-Administered Medications:     [Transfer Hold] cefTRIAXone (Rocephin) IVPB 1 g, 1 g, intravenous, q24h, Baldemar Salvador MD    gentamicin (Garamycin) 80 mg in sodium chloride 0.9% 50 mL IV, 80 mg, intravenous, Once, Sherwin Segal MD    [Transfer Hold] ibuprofen tablet 600 mg, 600 mg, oral, q6h PRN, Baldemar Salvador MD, 600 mg at 06/12/24 1432    sodium chloride 0.9% infusion, 100 mL/hr, intravenous, Continuous, Baldemar Salvador MD, Last Rate: 100 mL/hr at 06/12/24 1552, 100 mL/hr at 06/12/24 1552    [Transfer Hold] tamsulosin (Flomax) 24 hr capsule 0.4 mg, 0.4 mg, oral, Daily, Baldemar Salvador MD  Prior to Admission medications    Not on File     No Known Allergies  Social History     Tobacco Use    Smoking status: Some Days     Current packs/day: 0.25     Average packs/day: 0.3 packs/day for 5.0 years (1.3 ttl pk-yrs)     Types: Cigarettes     Start date: 6/12/2019    Smokeless tobacco: Not on file   Substance Use Topics    Alcohol use: Not on file         Chemistry    Lab Results   Component Value Date/Time     06/12/2024 0718    K 4.1 06/12/2024 0718     06/12/2024 0718    CO2 24 06/12/2024 0718    BUN 11 06/12/2024 0718    CREATININE 1.16 06/12/2024 0718    Lab Results   Component Value Date/Time    CALCIUM 8.8 06/12/2024 0718    ALKPHOS 83 06/11/2024 1742    AST 25  "06/11/2024 1742    ALT 51 06/11/2024 1742    BILITOT 0.8 06/11/2024 1742          Lab Results   Component Value Date/Time    WBC 5.6 06/12/2024 0718    HGB 13.8 06/12/2024 0718    HCT 42.3 06/12/2024 0718     (L) 06/12/2024 0718     No results found for: \"PROTIME\", \"PTT\", \"INR\"  No results found for this or any previous visit (from the past 4464 hour(s)).  No results found for this or any previous visit from the past 1095 days.    Vitals:    06/12/24 1445   BP: 150/89   Pulse: 101   Resp: 18   Temp: 36.4 °C (97.5 °F)   SpO2: 96%       Past Surgical History:   Procedure Laterality Date    APPENDECTOMY       Past Medical History:   Diagnosis Date    Hepatic steatosis 06/11/2024    Diffuse    Hydronephrosis of left kidney 06/11/2024    Mild    Left ureteral stone 06/11/2024    11 mm left UVJ obstructing calculus       Current Facility-Administered Medications:     cefTRIAXone (Rocephin) IVPB 1 g, 1 g, intravenous, q24h, Baldemar Salvador MD    ibuprofen tablet 600 mg, 600 mg, oral, q6h PRN, Baldemar Salvador MD, 600 mg at 06/12/24 1432    sodium chloride 0.9% infusion, 100 mL/hr, intravenous, Continuous, Baldemar Salvador MD, Last Rate: 100 mL/hr at 06/12/24 1552, 100 mL/hr at 06/12/24 1552    tamsulosin (Flomax) 24 hr capsule 0.4 mg, 0.4 mg, oral, Daily, Baldemar Salvador MD  Prior to Admission medications    Not on File     No Known Allergies  Social History     Tobacco Use    Smoking status: Some Days     Current packs/day: 0.25     Average packs/day: 0.3 packs/day for 5.0 years (1.3 ttl pk-yrs)     Types: Cigarettes     Start date: 6/12/2019    Smokeless tobacco: Not on file   Substance Use Topics    Alcohol use: Not on file         Chemistry    Lab Results   Component Value Date/Time     06/12/2024 0718    K 4.1 06/12/2024 0718     06/12/2024 0718    CO2 24 06/12/2024 0718    BUN 11 06/12/2024 0718    CREATININE 1.16 06/12/2024 0718    Lab Results   Component Value Date/Time    CALCIUM 8.8 " "06/12/2024 0718    ALKPHOS 83 06/11/2024 1742    AST 25 06/11/2024 1742    ALT 51 06/11/2024 1742    BILITOT 0.8 06/11/2024 1742          Lab Results   Component Value Date/Time    WBC 5.6 06/12/2024 0718    HGB 13.8 06/12/2024 0718    HCT 42.3 06/12/2024 0718     (L) 06/12/2024 0718     No results found for: \"PROTIME\", \"PTT\", \"INR\"  No results found for this or any previous visit (from the past 4464 hour(s)).  No results found for this or any previous visit from the past 1095 days.    Relevant Problems   /Renal   (+) Left nephrolithiasis       Clinical information reviewed:    Allergies  Meds   Med Hx  Surg Hx            NPO Detail:  No data recorded     Physical Exam    Airway  Mallampati: II     Cardiovascular - normal exam     Dental    Pulmonary    Abdominal            Anesthesia Plan    History of general anesthesia?: yes  History of complications of general anesthesia?: no    ASA 2     general     The patient is not a current smoker.  Patient was not previously instructed to abstain from smoking on day of procedure.  Patient did not smoke on day of procedure.  Education provided regarding risk of obstructive sleep apnea.  intravenous induction   Anesthetic plan and risks discussed with patient.    Plan discussed with CRNA.      "

## 2024-06-12 NOTE — CARE PLAN
The patient's goals for the shift include  pain control and rest.    The clinical goals for the shift include pain and fever management    Problem: Pain - Adult  Goal: Verbalizes/displays adequate comfort level or baseline comfort level  Outcome: Progressing     Problem: Safety - Adult  Goal: Free from fall injury  Outcome: Progressing     Problem: Discharge Planning  Goal: Discharge to home or other facility with appropriate resources  Outcome: Progressing     Problem: Chronic Conditions and Co-morbidities  Goal: Patient's chronic conditions and co-morbidity symptoms are monitored and maintained or improved  Outcome: Progressing

## 2024-06-12 NOTE — PROGRESS NOTES
06/12/24 1010   Discharge Planning   Patient expects to be discharged to: Pt is self pay. SW sent to email to [employer] to see if he was screened for Medicaid. Waiting on response.

## 2024-06-12 NOTE — CONSULTS
Reason For Consult  11 mm left ureter calculus with moderate left hydronephrosis, urinary tract infection    History Of Present Illness  Mikael Rodríguez is a 43 y.o. male with history of renal calculi presenting with dysuria, hematuria, subjective fever and chills for two days.  Patient presented to [facility] where imaging demonstrated an 11 mm left ureter calculus with moderate left hydronephrosis.  UA consistent with UTI.  He was transferred to [facility] for definitive management.  Patient currently denies fever, chills, pain.  Intermittent nausea but no vomiting.  Seen and examined resting in hospital bed.       Past Medical History  He has a past medical history of Hepatic steatosis (06/11/2024), Hydronephrosis of left kidney (06/11/2024), and Left ureteral stone (06/11/2024).    Surgical History  He has a past surgical history that includes Appendectomy.     Social History  He reports that he has been smoking cigarettes. He started smoking about 5 years ago. He has a 1.3 pack-year smoking history. He does not have any smokeless tobacco history on file. No history on file for alcohol use and drug use.    Family History  No family history on file.     Allergies  Patient has no known allergies.    Review of Systems  12 point ROS completed and no additional findings noted aside from what is listed in the HPI.     Physical Exam  Physical Exam  Vitals reviewed.   HENT:      Head: Normocephalic and atraumatic.   Eyes:      Extraocular Movements: Extraocular movements intact.      Conjunctiva/sclera: Conjunctivae normal.      Pupils: Pupils are equal, round, and reactive to light.   Cardiovascular:      Rate and Rhythm: Normal rate and regular rhythm.      Pulses: Normal pulses.   Pulmonary:      Effort: Pulmonary effort is normal.   Abdominal:      Palpations: Abdomen is soft.      Tenderness: There is no right CVA tenderness or left CVA tenderness.   Skin:     General: Skin is warm and dry.      Capillary  "Refill: Capillary refill takes less than 2 seconds.   Neurological:      General: No focal deficit present.      Mental Status: He is alert and oriented to person, place, and time.       Last Recorded Vitals  Blood pressure (!) 152/91, pulse 80, temperature 36.6 °C (97.9 °F), temperature source Temporal, resp. rate 18, height 1.727 m (5' 8\"), weight 88 kg (194 lb 0.1 oz), SpO2 99%.    Relevant Results  CT abdomen pelvis w IV contrast    Result Date: 6/11/2024  Interpreted By:  Mario Salmon, STUDY: CT ABDOMEN PELVIS W IV CONTRAST;  6/11/2024 6:18 pm   INDICATION: Signs/Symptoms:left flank pain with dysuria x 2 days, hx of kidney stones.   COMPARISON: CT abdomen and pelvis 05/14/2023   ACCESSION NUMBER(S): [id number]   ORDERING CLINICIAN: BRUCE CHESTER   TECHNIQUE: CT of the abdomen and pelvis was performed. Contiguous axial images were obtained at 3 mm slice thickness through the abdomen and pelvis. Coronal and sagittal reconstructions at 3 mm slice thickness were performed.  No intravenous contrast was administered.   FINDINGS: Please note that the evaluation of vessels, lymph nodes and organs is limited without intravenous contrast.   LOWER CHEST: Within normal limits   ABDOMEN:   LIVER: Diffuse steatosis. No focal lesion. Few scattered calcified granulomas.   BILE DUCTS: The intrahepatic and extrahepatic ducts are not dilated.   GALLBLADDER: The gallbladder is nondistended and without evidence of radiopaque stones.   PANCREAS: The pancreas appears unremarkable without evidence of ductal dilatation or masses.   SPLEEN: The spleen is normal in size without focal lesions.   ADRENAL GLANDS: Bilateral adrenal glands appear normal.   KIDNEYS AND URETERS: The kidneys are normal in size and unremarkable in appearance.  11 mm left UVJ obstructing calculus with mild diffuse hydroureteronephrosis. Additional 3 mm proximal right ureteral calculus without hydronephrosis.   PELVIS:   BLADDER: Mild circumferential urinary " bladder wall thickening, likely secondary to underdistention.   REPRODUCTIVE ORGANS: No pelvic masses.   BOWEL: The stomach is unremarkable.  The small and large bowel are normal in caliber and demonstrate no wall thickening.  The appendix is not definitely visualized. There is however no pericecal stranding or fluid.   VESSELS: No atherosclerotic changes are noted to the aorta and branching vessels. There is no aneurysmal dilatation.   PERITONEUM/RETROPERITONEUM/LYMPH NODES: No ascites or free air, no fluid collection.  No enlarged mesenteric lymph nodes.   ABDOMINAL WALL: Within normal limits.   BONES: No suspicious osseous lesions are identified.       11 mm left UVJ obstructing calculus with mild hydroureteronephrosis and 3 mm proximal right ureteral calculus without hydronephrosis. Diffuse hepatic steatosis.   Signed by: Mario Salmon 6/11/2024 6:40 PM Dictation workstation:   [id number]     Scheduled medications  cefTRIAXone, 1 g, intravenous, q24h  tamsulosin, 0.4 mg, oral, Daily      Continuous medications  sodium chloride 0.9%, 100 mL/hr, Last Rate: 100 mL/hr (06/12/24 0310)      PRN medications  PRN medications: ibuprofen  Results for orders placed or performed during the hospital encounter of 06/12/24 (from the past 24 hour(s))   CBC   Result Value Ref Range    WBC 5.6 4.4 - 11.3 x10*3/uL    nRBC 0.0 0.0 - 0.0 /100 WBCs    RBC 4.67 4.50 - 5.90 x10*6/uL    Hemoglobin 13.8 13.5 - 17.5 g/dL    Hematocrit 42.3 41.0 - 52.0 %    MCV 91 80 - 100 fL    MCH 29.6 26.0 - 34.0 pg    MCHC 32.6 32.0 - 36.0 g/dL    RDW 12.4 11.5 - 14.5 %    Platelets 143 (L) 150 - 450 x10*3/uL   Renal Function Panel   Result Value Ref Range    Glucose 119 (H) 74 - 99 mg/dL    Sodium 140 136 - 145 mmol/L    Potassium 4.1 3.5 - 5.3 mmol/L    Chloride 105 98 - 107 mmol/L    Bicarbonate 24 21 - 32 mmol/L    Anion Gap 15 10 - 20 mmol/L    Urea Nitrogen 11 6 - 23 mg/dL    Creatinine 1.16 0.50 - 1.30 mg/dL    eGFR 81 >60 mL/min/1.73m*2     Calcium 8.8 8.6 - 10.3 mg/dL    Phosphorus 3.0 2.5 - 4.9 mg/dL    Albumin 4.0 3.4 - 5.0 g/dL   Magnesium   Result Value Ref Range    Magnesium 1.84 1.60 - 2.40 mg/dL        Assessment/Plan   43 year old male with 11 mm left ureter calculus with moderate left hydronephrosis  - imaging and labs reviewed  - NPO for the OR today with Dr. Segal for cystoscopy with stent placement (430 pm)  - continue Flomax and Rocephin - pending urine culture  - remainder of care per primary    Seen and discussed with Dr. Luzma Sahni, APRN-CNP

## 2024-06-12 NOTE — H&P
"  HPI    Mikael Rodríguez is a 43 y.o. male with a PMH of kidney stones who presented to the hospital for recurrent kidney stones. Patient is a transfer from [facility]. Patient is Ukrainian speaking, so  services had to be utilized. Patient states that for the past two days, he's been having subjective fevers and worsening headaches. Yesterday when he tried to pee, he felt a burning sensation as well as hematuria. States that his last episode of urination was at [place], however it was not painful due to effects of the Toradol and antibiotics. Patient denies any other symptoms at this time.     Review of Systems   Constitutional:  Positive for fever. Negative for chills.   HENT: Negative.     Respiratory: Negative.     Cardiovascular: Negative.    Gastrointestinal: Negative.    Genitourinary:  Positive for dysuria and hematuria. Negative for flank pain and urgency.   Musculoskeletal: Negative.    Neurological:  Positive for headaches.         ED Course   Vitals - BP (!) 161/93 (BP Location: Right arm, Patient Position: Lying)   Pulse 103   Temp (!) 38.6 °C (101.5 °F) (Temporal)   Resp 18   SpO2 96%     Lab Results   Component Value Date    WBC 9.8 06/11/2024    HGB 14.2 06/11/2024    HCT 43.0 06/11/2024    MCV 90 06/11/2024     06/11/2024     Lab Results   Component Value Date    GLUCOSE 124 (H) 06/11/2024    CALCIUM 9.8 06/11/2024     06/11/2024    K 3.3 (L) 06/11/2024    CO2 28 06/11/2024     06/11/2024    BUN 16 06/11/2024    CREATININE 1.22 06/11/2024   No results found for: \"TROPHS\"No results found for: \"BNP\"No results found for: \"DDIMERVTE\"    Imaging  CT abdomen pelvis w IV contrast 06/11/2024    Narrative  Interpreted By:  Mario Salmon,  STUDY:  CT ABDOMEN PELVIS W IV CONTRAST;  6/11/2024 6:18 pm    INDICATION:  Signs/Symptoms:left flank pain with dysuria x 2 days, hx of kidney  stones.    COMPARISON:  CT abdomen and pelvis 05/14/2023    ACCESSION " NUMBER(S):  [id number]    ORDERING CLINICIAN:  BRUCE CHESTER    TECHNIQUE:  CT of the abdomen and pelvis was performed. Contiguous axial images  were obtained at 3 mm slice thickness through the abdomen and pelvis.  Coronal and sagittal reconstructions at 3 mm slice thickness were  performed.  No intravenous contrast was administered.    FINDINGS:  Please note that the evaluation of vessels, lymph nodes and organs is  limited without intravenous contrast.    LOWER CHEST:  Within normal limits    ABDOMEN:    LIVER:  Diffuse steatosis. No focal lesion. Few scattered calcified  granulomas.    BILE DUCTS:  The intrahepatic and extrahepatic ducts are not dilated.    GALLBLADDER:  The gallbladder is nondistended and without evidence of radiopaque  stones.    PANCREAS:  The pancreas appears unremarkable without evidence of ductal  dilatation or masses.    SPLEEN:  The spleen is normal in size without focal lesions.    ADRENAL GLANDS:  Bilateral adrenal glands appear normal.    KIDNEYS AND URETERS:  The kidneys are normal in size and unremarkable in appearance.  11 mm  left UVJ obstructing calculus with mild diffuse  hydroureteronephrosis. Additional 3 mm proximal right ureteral  calculus without hydronephrosis.    PELVIS:    BLADDER:  Mild circumferential urinary bladder wall thickening, likely  secondary to underdistention.    REPRODUCTIVE ORGANS:  No pelvic masses.    BOWEL:  The stomach is unremarkable.  The small and large bowel are normal in  caliber and demonstrate no wall thickening.  The appendix is not  definitely visualized. There is however no pericecal stranding or  fluid.    VESSELS:  No atherosclerotic changes are noted to the aorta and branching  vessels. There is no aneurysmal dilatation.    PERITONEUM/RETROPERITONEUM/LYMPH NODES:  No ascites or free air, no fluid collection.  No enlarged mesenteric  lymph nodes.    ABDOMINAL WALL:  Within normal limits.    BONES:  No suspicious osseous lesions are  identified.    Impression  11 mm left UVJ obstructing calculus with mild hydroureteronephrosis  and 3 mm proximal right ureteral calculus without hydronephrosis.  Diffuse hepatic steatosis.    Signed by: Mario Salmon 6/11/2024 6:40 PM  Dictation workstation:   [id number]           Interventions - Ceftriaxone, Toradol, NS 1L bolus,     Micro:   - Blood culture: x2 collected  - UA: Light orange, +2 proteinuria, +3 blood, trace ketones, +2 urobilinogen, 500 leukocyte esterase, WBC > 50, RBC > 20, +1 bacteriuria    ROS: 12 points review of system is negative except as stated in the HPI above.     Past Medical History   He has no past medical history on file.  Surgical History   No past surgical history on file.  Family History   No family history on file.  Social History     Social History     Socioeconomic History    Marital status: Single     Spouse name: Not on file    Number of children: Not on file    Years of education: Not on file    Highest education level: Not on file   Occupational History    Not on file   Tobacco Use    Smoking status: Not on file    Smokeless tobacco: Not on file   Substance and Sexual Activity    Alcohol use: Not on file    Drug use: Not on file    Sexual activity: Not on file   Other Topics Concern    Not on file   Social History Narrative    Not on file     Social Determinants of Health     Financial Resource Strain: Not on file   Food Insecurity: Not on file   Transportation Needs: Not on file   Physical Activity: Not on file   Stress: Not on file   Social Connections: Not on file   Intimate Partner Violence: Not on file   Housing Stability: Not on file       Tobacco Use: High Risk (9/13/2023)    Received from [facility]    Patient History     Smoking Tobacco Use: Every Day     Smokeless Tobacco Use: Never     Passive Exposure: Not on file        Social History     Substance and Sexual Activity   Alcohol Use None      Allergies   No Known Allergies   Meds    Scheduled  medications  cefTRIAXone, 1 g, intravenous, q24h  potassium chloride, 20 mEq, intravenous, q2h  tamsulosin, 0.4 mg, oral, Daily      Continuous medications  sodium chloride 0.9%, 100 mL/hr      PRN medications  PRN medications: ibuprofen       Objective     Vitals  Visit Vitals  BP (!) 161/93 (BP Location: Right arm, Patient Position: Lying)   Pulse 103   Temp (!) 38.6 °C (101.5 °F) (Temporal)   Resp 18   SpO2 96%        Physical Examination:  Physical Exam  Vitals reviewed.   Constitutional:       General: He is not in acute distress.     Appearance: He is not ill-appearing.   Cardiovascular:      Rate and Rhythm: Normal rate and regular rhythm.      Heart sounds: Normal heart sounds. No murmur heard.  Pulmonary:      Effort: No respiratory distress.      Breath sounds: Normal breath sounds. No wheezing or rhonchi.   Abdominal:      General: There is no distension.      Palpations: Abdomen is soft.      Tenderness: There is no abdominal tenderness. There is no right CVA tenderness or left CVA tenderness.   Musculoskeletal:         General: No tenderness.      Right lower leg: No edema.      Left lower leg: No edema.   Neurological:      General: No focal deficit present.      Mental Status: He is alert and oriented to person, place, and time. Mental status is at baseline.           I/Os  No intake or output data in the 24 hours ending 06/12/24 0212    Labs:   Results from last 72 hours   Lab Units 06/11/24  1742   SODIUM mmol/L 140   POTASSIUM mmol/L 3.3*   CHLORIDE mmol/L 104   CO2 mmol/L 28   BUN mg/dL 16   CREATININE mg/dL 1.22   GLUCOSE mg/dL 124*   CALCIUM mg/dL 9.8   ANION GAP mmol/L 11   EGFR mL/min/1.73m*2 76      Results from last 72 hours   Lab Units 06/11/24  1742   WBC AUTO x10*3/uL 9.8   HEMOGLOBIN g/dL 14.2   HEMATOCRIT % 43.0   PLATELETS AUTO x10*3/uL 175   NEUTROS PCT AUTO % 83.7   LYMPHS PCT AUTO % 9.6   MONOS PCT AUTO % 5.7   EOS PCT AUTO % 0.3      Lab Results   Component Value Date    CALCIUM  "9.8 06/11/2024      No results found for: \"CRP\"   [unfilled]     Micro/ID:   No results found for the last 90 days.                   No lab exists for component: \"AGALPCRNB\"   .ID  Lab Results   Component Value Date    URINECULTURE NO GROWTH 05/14/2023    BLOODCULT Loaded on Instrument - Culture in progress 06/11/2024    BLOODCULT Loaded on Instrument - Culture in progress 06/11/2024     Images    CT abdomen pelvis w IV contrast  Narrative: Interpreted By:  Mario Salmon,   STUDY:  CT ABDOMEN PELVIS W IV CONTRAST;  6/11/2024 6:18 pm      INDICATION:  Signs/Symptoms:left flank pain with dysuria x 2 days, hx of kidney  stones.      COMPARISON:  CT abdomen and pelvis 05/14/2023      ACCESSION NUMBER(S):  [id number]      ORDERING CLINICIAN:  BRUCE CHESTER      TECHNIQUE:  CT of the abdomen and pelvis was performed. Contiguous axial images  were obtained at 3 mm slice thickness through the abdomen and pelvis.  Coronal and sagittal reconstructions at 3 mm slice thickness were  performed.  No intravenous contrast was administered.      FINDINGS:  Please note that the evaluation of vessels, lymph nodes and organs is  limited without intravenous contrast.      LOWER CHEST:  Within normal limits      ABDOMEN:      LIVER:  Diffuse steatosis. No focal lesion. Few scattered calcified  granulomas.      BILE DUCTS:  The intrahepatic and extrahepatic ducts are not dilated.      GALLBLADDER:  The gallbladder is nondistended and without evidence of radiopaque  stones.      PANCREAS:  The pancreas appears unremarkable without evidence of ductal  dilatation or masses.      SPLEEN:  The spleen is normal in size without focal lesions.      ADRENAL GLANDS:  Bilateral adrenal glands appear normal.      KIDNEYS AND URETERS:  The kidneys are normal in size and unremarkable in appearance.  11 mm  left UVJ obstructing calculus with mild diffuse  hydroureteronephrosis. Additional 3 mm proximal right ureteral  calculus without " hydronephrosis.      PELVIS:      BLADDER:  Mild circumferential urinary bladder wall thickening, likely  secondary to underdistention.      REPRODUCTIVE ORGANS:  No pelvic masses.      BOWEL:  The stomach is unremarkable.  The small and large bowel are normal in  caliber and demonstrate no wall thickening.  The appendix is not  definitely visualized. There is however no pericecal stranding or  fluid.      VESSELS:  No atherosclerotic changes are noted to the aorta and branching  vessels. There is no aneurysmal dilatation.      PERITONEUM/RETROPERITONEUM/LYMPH NODES:  No ascites or free air, no fluid collection.  No enlarged mesenteric  lymph nodes.      ABDOMINAL WALL:  Within normal limits.      BONES:  No suspicious osseous lesions are identified.      Impression: 11 mm left UVJ obstructing calculus with mild hydroureteronephrosis  and 3 mm proximal right ureteral calculus without hydronephrosis.  Diffuse hepatic steatosis.      Signed by: Mario Salmon 6/11/2024 6:40 PM  Dictation workstation:   [id number]    Assessment and Plan      Mikael Rodríguez is a 43 y.o. male admitted for left sided obstructive nephrolithiasis.    Acute Medical Issues   #Left sided obstructive nephrolithiasis  #UTI  - Patient presented to the ED with a 2 day history of fever, headache, and yesterday with dysuria and hematuria  - CT scan imaging showing 11 mm left UVJ obstructing calculus with mild hydroureteronephrosis  - UA showing signs of UTI. Pending urine cultures  - Pending blood cultures  - S/p ceftriaxone, toradol, and 1L NS bolus from ED  - Continue ceftriaxone (6/11 - )  - Ibuprofen 600 mg q6h PRN for pain and fever control  - Start maintenance NS @ 100 mL/hr  - Tamsulosin 0.4 mg daily  - NPO except sips of water with meds, tentative plan for cystoscopy with ureteral stent insertion tomorrow  - Urology consulted, appreciate recs    #Hypokalemia  - K+ 3.3, repleted  - Continue to monitor with daily RFPs and  replete as needed    Chronic Medical Issues   None    F: NS @ 100 mL/hr  E: Replete PRN  N: NPO  GI ppx: None  DVT ppx: Holding in the setting of planned procedure  Antibiotics: Ceftriaxone (6/11 - )  Tubes/Lines/Drains: PIVs    Code Status: Full Code  Emergency Contact: No emergency contact information on file.     Disposition: 43 y.o.male admitted for left kidney stone management. Anticipate LOS > 2 Midnights.    Baldemar Salvador MD

## 2024-06-12 NOTE — CARE PLAN
The patient's goals for the shift include  getting procedure done and feeling better.     The clinical goals for the shift include Pain management.

## 2024-06-12 NOTE — CARE PLAN
"The patient's goals for the shift include  \"feeling better.\"    The clinical goals for the shift include Pain management r/t kidney stones.       Problem: Pain - Adult  Goal: Verbalizes/displays adequate comfort level or baseline comfort level  Outcome: Progressing     Problem: Safety - Adult  Goal: Free from fall injury  Outcome: Met     "

## 2024-06-12 NOTE — PROGRESS NOTES
06/12/24 1121   Discharge Planning   Living Arrangements Family members  (brother and nephew)   Support Systems Family members   Assistance Needed A&Ox3, independent in ADL's, no AD, no DME, drives   Type of Residence Private residence   Number of Stairs Within Residence 12   Do you have animals or pets at home? No   Who is requesting discharge planning? Provider   Home or Post Acute Services None   Patient expects to be discharged to: Home with no discharge needs identified

## 2024-06-13 VITALS
RESPIRATION RATE: 18 BRPM | BODY MASS INDEX: 29.4 KG/M2 | DIASTOLIC BLOOD PRESSURE: 71 MMHG | HEIGHT: 68 IN | HEART RATE: 70 BPM | SYSTOLIC BLOOD PRESSURE: 128 MMHG | OXYGEN SATURATION: 96 % | WEIGHT: 194 LBS | TEMPERATURE: 96.8 F

## 2024-06-13 LAB
ALBUMIN SERPL BCP-MCNC: 3.6 G/DL (ref 3.4–5)
ANION GAP SERPL CALC-SCNC: 15 MMOL/L (ref 10–20)
BASOPHILS # BLD AUTO: 0.01 X10*3/UL (ref 0–0.1)
BASOPHILS NFR BLD AUTO: 0.3 %
BUN SERPL-MCNC: 11 MG/DL (ref 6–23)
CALCIUM SERPL-MCNC: 8.1 MG/DL (ref 8.6–10.3)
CHLORIDE SERPL-SCNC: 107 MMOL/L (ref 98–107)
CO2 SERPL-SCNC: 20 MMOL/L (ref 21–32)
CREAT SERPL-MCNC: 1.07 MG/DL (ref 0.5–1.3)
EGFRCR SERPLBLD CKD-EPI 2021: 89 ML/MIN/1.73M*2
EOSINOPHIL # BLD AUTO: 0 X10*3/UL (ref 0–0.7)
EOSINOPHIL NFR BLD AUTO: 0 %
ERYTHROCYTE [DISTWIDTH] IN BLOOD BY AUTOMATED COUNT: 12.3 % (ref 11.5–14.5)
GLUCOSE SERPL-MCNC: 126 MG/DL (ref 74–99)
HCT VFR BLD AUTO: 37 % (ref 41–52)
HGB BLD-MCNC: 12.4 G/DL (ref 13.5–17.5)
IMM GRANULOCYTES # BLD AUTO: 0.02 X10*3/UL (ref 0–0.7)
IMM GRANULOCYTES NFR BLD AUTO: 0.6 % (ref 0–0.9)
LYMPHOCYTES # BLD AUTO: 0.81 X10*3/UL (ref 1.2–4.8)
LYMPHOCYTES NFR BLD AUTO: 24.4 %
MAGNESIUM SERPL-MCNC: 1.76 MG/DL (ref 1.6–2.4)
MCH RBC QN AUTO: 29.4 PG (ref 26–34)
MCHC RBC AUTO-ENTMCNC: 33.5 G/DL (ref 32–36)
MCV RBC AUTO: 88 FL (ref 80–100)
MONOCYTES # BLD AUTO: 0.34 X10*3/UL (ref 0.1–1)
MONOCYTES NFR BLD AUTO: 10.2 %
NEUTROPHILS # BLD AUTO: 2.14 X10*3/UL (ref 1.2–7.7)
NEUTROPHILS NFR BLD AUTO: 64.5 %
NRBC BLD-RTO: 0 /100 WBCS (ref 0–0)
PHOSPHATE SERPL-MCNC: 2.3 MG/DL (ref 2.5–4.9)
PLATELET # BLD AUTO: 139 X10*3/UL (ref 150–450)
POTASSIUM SERPL-SCNC: 3.5 MMOL/L (ref 3.5–5.3)
RBC # BLD AUTO: 4.22 X10*6/UL (ref 4.5–5.9)
RBC MORPH BLD: NORMAL
SODIUM SERPL-SCNC: 138 MMOL/L (ref 136–145)
WBC # BLD AUTO: 3.3 X10*3/UL (ref 4.4–11.3)

## 2024-06-13 PROCEDURE — 2500000005 HC RX 250 GENERAL PHARMACY W/O HCPCS

## 2024-06-13 PROCEDURE — 83735 ASSAY OF MAGNESIUM: CPT

## 2024-06-13 PROCEDURE — 2500000002 HC RX 250 W HCPCS SELF ADMINISTERED DRUGS (ALT 637 FOR MEDICARE OP, ALT 636 FOR OP/ED)

## 2024-06-13 PROCEDURE — 36415 COLL VENOUS BLD VENIPUNCTURE: CPT

## 2024-06-13 PROCEDURE — 2500000001 HC RX 250 WO HCPCS SELF ADMINISTERED DRUGS (ALT 637 FOR MEDICARE OP)

## 2024-06-13 PROCEDURE — 2500000004 HC RX 250 GENERAL PHARMACY W/ HCPCS (ALT 636 FOR OP/ED)

## 2024-06-13 PROCEDURE — 80069 RENAL FUNCTION PANEL: CPT

## 2024-06-13 PROCEDURE — 99238 HOSP IP/OBS DSCHRG MGMT 30/<: CPT

## 2024-06-13 PROCEDURE — 85025 COMPLETE CBC W/AUTO DIFF WBC: CPT

## 2024-06-13 RX ORDER — TAMSULOSIN HYDROCHLORIDE 0.4 MG/1
0.4 CAPSULE ORAL DAILY
Qty: 30 CAPSULE | Refills: 0 | Status: SHIPPED | OUTPATIENT
Start: 2024-06-14 | End: 2024-07-14

## 2024-06-13 ASSESSMENT — COGNITIVE AND FUNCTIONAL STATUS - GENERAL
DAILY ACTIVITIY SCORE: 24
MOBILITY SCORE: 24

## 2024-06-13 ASSESSMENT — PAIN SCALES - GENERAL: PAINLEVEL_OUTOF10: 0 - NO PAIN

## 2024-06-13 NOTE — HOSPITAL COURSE
Mikael Rodríguez is a 43 y.o. male with a PMH of kidney stones who presented to the hospital for recurrent kidney stones. Patient is a transfer from [facility]     ED course  - Patient presented to the ED with a 2 day history of fever, headache, and yesterday with dysuria and hematuria  - CT scan imaging showing 11 mm left UVJ obstructing calculus with mild hydroureteronephrosis  - UA showing signs of UTI. Pending urine cultures  - Pending blood cultures  - S/p ceftriaxone, toradol, and 1L NS bolus from ED  - Ibuprofen 600 mg q6h PRN for pain and fever control  - Start maintenance NS @ 100 mL/hr  - Tamsulosin 0.4 mg daily  - NPO except sips of water with meds, tentative plan for cystoscopy with ureteral stent insertion tomorrow  - Urology consulted, appreciate recs    Floor Course   -6/12 update: N.p.o. for the OR for cystoscopy with stent placement at 4:30 PM today.  Patient will be continued on Flomax and Rocephin pending urine culture.  -6/13: Hemodynamically stable for dc. Follow up instructions include next steps for Urology follow up.

## 2024-06-13 NOTE — DISCHARGE SUMMARY
Discharge Diagnosis  Left nephrolithiasis    Issues Requiring Follow-Up  Ureteroscopy and laser to be schedule [facility] on 7/19.    Discharge Meds     Your medication list      You have not been prescribed any medications.         Test Results Pending At Discharge  Pending Labs       No current pending labs.            Hospital Course  Mikael Rodríguez is a 43 y.o. male with a PMH of kidney stones who presented to the hospital for recurrent kidney stones. Patient is a transfer from [facility]     ED course  - Patient presented to the ED with a 2 day history of fever, headache, and yesterday with dysuria and hematuria  - CT scan imaging showing 11 mm left UVJ obstructing calculus with mild hydroureteronephrosis  - UA showing signs of UTI. Pending urine cultures  - Pending blood cultures  - S/p ceftriaxone, toradol, and 1L NS bolus from ED  - Ibuprofen 600 mg q6h PRN for pain and fever control  - Start maintenance NS @ 100 mL/hr  - Tamsulosin 0.4 mg daily  - NPO except sips of water with meds, tentative plan for cystoscopy with ureteral stent insertion tomorrow  - Urology consulted, appreciate recs    Floor Course   -6/12 update: N.p.o. for the OR for cystoscopy with stent placement at 4:30 PM today.  Patient will be continued on Flomax and Rocephin pending urine culture.  -6/13: Hemodynamically stable for dc. Follow up instructions include next steps for Urology follow up.       Pertinent Physical Exam At Time of Discharge  Physical Exam  Constitutional:       Appearance: Normal appearance.   Cardiovascular:      Rate and Rhythm: Normal rate.   Pulmonary:      Effort: Pulmonary effort is normal. No respiratory distress.      Breath sounds: Normal breath sounds.   Abdominal:      General: There is no distension.      Tenderness: There is no abdominal tenderness. There is no guarding or rebound.   Neurological:      General: No focal deficit present.      Mental Status: He is alert and oriented to person,  place, and time.         Outpatient Follow-Up  No future appointments.      Uli Matthews MD

## 2024-06-13 NOTE — PROGRESS NOTES
"Mikael Rodríguez is a 43 y.o. male on day 1 of admission presenting with Left nephrolithiasis.    Subjective   No acute overnight events.  Patient states he is feeling better with stent in place.  Denies pain, nausea, dysuria.  Hoping to be discharged today home.    Objective     Physical Exam  Vitals reviewed.   HENT:      Head: Normocephalic and atraumatic.   Eyes:      Conjunctiva/sclera: Conjunctivae normal.      Pupils: Pupils are equal, round, and reactive to light.   Cardiovascular:      Rate and Rhythm: Normal rate and regular rhythm.      Pulses: Normal pulses.   Pulmonary:      Effort: Pulmonary effort is normal.   Abdominal:      Palpations: Abdomen is soft.      Tenderness: There is no right CVA tenderness or left CVA tenderness.   Skin:     General: Skin is warm and dry.      Capillary Refill: Capillary refill takes less than 2 seconds.   Neurological:      General: No focal deficit present.      Mental Status: He is alert and oriented to person, place, and time.       Last Recorded Vitals  Blood pressure 134/83, pulse 76, temperature 37 °C (98.6 °F), temperature source Temporal, resp. rate 18, height 1.727 m (5' 8\"), weight 88 kg (194 lb 0.1 oz), SpO2 96%.  Intake/Output last 3 Shifts:  I/O last 3 completed shifts:  In: 1971.7 (22.4 mL/kg) [P.O.:120; I.V.:1601.7 (18.2 mL/kg); IV Piggyback:250]  Out: 50 (0.6 mL/kg) [Urine:50 (0 mL/kg/hr)]  Weight: 88 kg     Relevant Results  FL pyelogram retrograde    Result Date: 6/12/2024  Interpreted By:  Ike Santos, STUDY: FL PYELOGRAM RETROGRADE;  6/12/2024 5:23 pm   INDICATION: Signs/Symptoms:surgery.   COMPARISON: Yesterday's enhanced abdominal and pelvic CT.   ACCESSION NUMBER(S): [id number]   ORDERING CLINICIAN: THI CASTRO   TECHNIQUE: Fluoroscopic support was provided to urology to perform this left retrograde pyelogram. 7 spot fluoroscopic images with circular coning and a small field-of-view were submitted for interpretation on " 06/12/2024. 13.7 seconds of fluoroscopy time was utilized. Recommend correlation to real time fluoroscopy and to final operative report.   FINDINGS: The images demonstrate insertion of a left ureteral stent with its upper pigtail in a considerably hydronephrotic renal collecting system and lower end just above the left side of the pubic symphysis, presumably in the bladder. The visualized segments of left ureter are quite dilated. A pair of calcifications are projected just medial to the stent above the left pubic ring. Whether one of these corresponds to the 11 mm left UVJ stone reported on yesterday's CT could be further evaluated with a follow-up CT if clinically indicated.       1. Fluoroscopic support was provided to perform this left retrograde pyelogram. 2. Findings as described including left hydroureteronephrosis, a left ureteral stent insertion and a pair of lower left pelvic calcifications. Recommend correlation to final real time fluoroscopic and operative report.     MACRO: None   Signed by: Ike Santos 6/12/2024 6:42 PM Dictation workstation:   [id number]    CT abdomen pelvis w IV contrast    Result Date: 6/11/2024  Interpreted By:  Mario Salmon, STUDY: CT ABDOMEN PELVIS W IV CONTRAST;  6/11/2024 6:18 pm   INDICATION: Signs/Symptoms:left flank pain with dysuria x 2 days, hx of kidney stones.   COMPARISON: CT abdomen and pelvis 05/14/2023   ACCESSION NUMBER(S): [id number]   ORDERING CLINICIAN: BRUCE CHESTER   TECHNIQUE: CT of the abdomen and pelvis was performed. Contiguous axial images were obtained at 3 mm slice thickness through the abdomen and pelvis. Coronal and sagittal reconstructions at 3 mm slice thickness were performed.  No intravenous contrast was administered.   FINDINGS: Please note that the evaluation of vessels, lymph nodes and organs is limited without intravenous contrast.   LOWER CHEST: Within normal limits   ABDOMEN:   LIVER: Diffuse steatosis. No focal lesion. Few  scattered calcified granulomas.   BILE DUCTS: The intrahepatic and extrahepatic ducts are not dilated.   GALLBLADDER: The gallbladder is nondistended and without evidence of radiopaque stones.   PANCREAS: The pancreas appears unremarkable without evidence of ductal dilatation or masses.   SPLEEN: The spleen is normal in size without focal lesions.   ADRENAL GLANDS: Bilateral adrenal glands appear normal.   KIDNEYS AND URETERS: The kidneys are normal in size and unremarkable in appearance.  11 mm left UVJ obstructing calculus with mild diffuse hydroureteronephrosis. Additional 3 mm proximal right ureteral calculus without hydronephrosis.   PELVIS:   BLADDER: Mild circumferential urinary bladder wall thickening, likely secondary to underdistention.   REPRODUCTIVE ORGANS: No pelvic masses.   BOWEL: The stomach is unremarkable.  The small and large bowel are normal in caliber and demonstrate no wall thickening.  The appendix is not definitely visualized. There is however no pericecal stranding or fluid.   VESSELS: No atherosclerotic changes are noted to the aorta and branching vessels. There is no aneurysmal dilatation.   PERITONEUM/RETROPERITONEUM/LYMPH NODES: No ascites or free air, no fluid collection.  No enlarged mesenteric lymph nodes.   ABDOMINAL WALL: Within normal limits.   BONES: No suspicious osseous lesions are identified.       11 mm left UVJ obstructing calculus with mild hydroureteronephrosis and 3 mm proximal right ureteral calculus without hydronephrosis. Diffuse hepatic steatosis.   Signed by: Mario Salmon 6/11/2024 6:40 PM Dictation workstation:   [id number]     Scheduled medications  cefTRIAXone, 1 g, intravenous, q24h  polyethylene glycol, 17 g, oral, Daily  sennosides-docusate sodium, 1 tablet, oral, Nightly  tamsulosin, 0.4 mg, oral, Daily      Continuous medications  sodium chloride 0.9%, 100 mL/hr, Last Rate: 100 mL/hr (06/12/24 7003)      PRN medications  PRN medications:  ibuprofen  Results for orders placed or performed during the hospital encounter of 06/12/24 (from the past 24 hour(s))   Renal Function Panel   Result Value Ref Range    Glucose 126 (H) 74 - 99 mg/dL    Sodium 138 136 - 145 mmol/L    Potassium 3.5 3.5 - 5.3 mmol/L    Chloride 107 98 - 107 mmol/L    Bicarbonate 20 (L) 21 - 32 mmol/L    Anion Gap 15 10 - 20 mmol/L    Urea Nitrogen 11 6 - 23 mg/dL    Creatinine 1.07 0.50 - 1.30 mg/dL    eGFR 89 >60 mL/min/1.73m*2    Calcium 8.1 (L) 8.6 - 10.3 mg/dL    Phosphorus 2.3 (L) 2.5 - 4.9 mg/dL    Albumin 3.6 3.4 - 5.0 g/dL   Magnesium   Result Value Ref Range    Magnesium 1.76 1.60 - 2.40 mg/dL   CBC and Auto Differential   Result Value Ref Range    WBC 3.3 (L) 4.4 - 11.3 x10*3/uL    nRBC 0.0 0.0 - 0.0 /100 WBCs    RBC 4.22 (L) 4.50 - 5.90 x10*6/uL    Hemoglobin 12.4 (L) 13.5 - 17.5 g/dL    Hematocrit 37.0 (L) 41.0 - 52.0 %    MCV 88 80 - 100 fL    MCH 29.4 26.0 - 34.0 pg    MCHC 33.5 32.0 - 36.0 g/dL    RDW 12.3 11.5 - 14.5 %    Platelets 139 (L) 150 - 450 x10*3/uL    Neutrophils % 64.5 40.0 - 80.0 %    Immature Granulocytes %, Automated 0.6 0.0 - 0.9 %    Lymphocytes % 24.4 13.0 - 44.0 %    Monocytes % 10.2 2.0 - 10.0 %    Eosinophils % 0.0 0.0 - 6.0 %    Basophils % 0.3 0.0 - 2.0 %    Neutrophils Absolute 2.14 1.20 - 7.70 x10*3/uL    Immature Granulocytes Absolute, Automated 0.02 0.00 - 0.70 x10*3/uL    Lymphocytes Absolute 0.81 (L) 1.20 - 4.80 x10*3/uL    Monocytes Absolute 0.34 0.10 - 1.00 x10*3/uL    Eosinophils Absolute 0.00 0.00 - 0.70 x10*3/uL    Basophils Absolute 0.01 0.00 - 0.10 x10*3/uL   Morphology   Result Value Ref Range    RBC Morphology No significant RBC morphology present        Assessment/Plan   Principal Problem:    Left nephrolithiasis  Active Problems:    Left ureteral stone    43 year old male POD 1 from cystoscopy with left stent placement    - AM labs reviewed- creatinine stable  - patient will need ureteroscopy and laser to be scheduled in [place] on  7/19   - patient can be discharged home from urology stand perspective     Discussed with Dr. Luzma SEGUNDO spent 30 minutes in the professional and overall care of this patient.    Donna Sahni, TERRI-CNP

## 2024-06-13 NOTE — DISCHARGE INSTRUCTIONS
1) Following recommendations were made on your discharge day:    -Cont. Tamsulosin .4mg daily  - patient will need ureteroscopy and laser to be scheduled in [place] on 7/19       2) Please, follow-up with your primary care provider within 7 to 14 days after leaving the hospital. /appointment services has been requested to make an appointment for you, however if you do not hear back from them within 1 to 2 days, please call your primary physician's office to schedule an appointment. Bring your photo ID and insurance card to your appointment.   [facility]  services can be reached at [phone] and appointment services can be reached at [phone].    - Please, call   or appointment services and schedule a follow-up with your PCP within 1-2 weeks after you leave the hospital.    3) If you experience any worsening symptoms or have any concerns, please contact your primary care provider to schedule an appointment. If you cannot get in touch with your primary care physician, please return to the nearest emergency room or urgent care clinic for an evaluation and treatment.    Thank you for choosing [facility] and allowing us to partake in your medical care!    - Your primary care inpatient team.

## 2024-06-13 NOTE — PROGRESS NOTES
06/13/24 1239   Discharge Planning   Living Arrangements Family members  (brother and nephew)   Support Systems Family members   Assistance Needed A&Ox3, independent in ADL's, no AD, no DME, drives   Type of Residence Private residence   Number of Stairs Within Residence 12   Do you have animals or pets at home? No   Who is requesting discharge planning? Provider   Home or Post Acute Services None   Patient expects to be discharged to: Home with no discharge needs identified

## 2024-06-14 LAB — BACTERIA UR CULT: ABNORMAL

## 2024-06-14 NOTE — SIGNIFICANT EVENT
Follow Up Phone Call    Outgoing phone call    Spoke to: Mikael Rodríguez Relationship:self   Phone number: [phone]      Outcome: contacted patient/ family   No chief complaint on file.         Diagnosis:Not applicable    Person answering phone does not speak english.

## 2024-06-16 LAB
BACTERIA BLD CULT: NORMAL
BACTERIA BLD CULT: NORMAL

## 2024-07-02 ENCOUNTER — PREP FOR PROCEDURE (OUTPATIENT)
Dept: UROLOGY | Facility: HOSPITAL | Age: 44
End: 2024-07-02

## 2024-07-02 DIAGNOSIS — N20.1 LEFT URETERAL STONE: Primary | ICD-10-CM

## 2024-07-02 DIAGNOSIS — N39.0 URINARY TRACT INFECTION WITHOUT HEMATURIA, SITE UNSPECIFIED: ICD-10-CM

## 2024-07-09 ENCOUNTER — PRE-ADMISSION TESTING (OUTPATIENT)
Dept: PREADMISSION TESTING | Facility: HOSPITAL | Age: 44
End: 2024-07-09
Payer: COMMERCIAL

## 2024-07-09 ENCOUNTER — ANESTHESIA EVENT (OUTPATIENT)
Dept: OPERATING ROOM | Facility: HOSPITAL | Age: 44
End: 2024-07-09
Payer: COMMERCIAL

## 2024-07-09 ENCOUNTER — HOSPITAL ENCOUNTER (OUTPATIENT)
Dept: RADIOLOGY | Facility: HOSPITAL | Age: 44
Discharge: HOME | End: 2024-07-09
Payer: COMMERCIAL

## 2024-07-09 ENCOUNTER — APPOINTMENT (OUTPATIENT)
Dept: LAB | Facility: LAB | Age: 44
End: 2024-07-09
Payer: COMMERCIAL

## 2024-07-09 ENCOUNTER — HOSPITAL ENCOUNTER (OUTPATIENT)
Dept: CARDIOLOGY | Facility: HOSPITAL | Age: 44
Discharge: HOME | End: 2024-07-09
Payer: COMMERCIAL

## 2024-07-09 VITALS — BODY MASS INDEX: 29.16 KG/M2 | WEIGHT: 191.8 LBS

## 2024-07-09 DIAGNOSIS — N20.1 LEFT URETERAL STONE: ICD-10-CM

## 2024-07-09 DIAGNOSIS — Z01.818 PREOP TESTING: ICD-10-CM

## 2024-07-09 DIAGNOSIS — N39.0 URINARY TRACT INFECTION WITHOUT HEMATURIA, SITE UNSPECIFIED: ICD-10-CM

## 2024-07-09 DIAGNOSIS — Z01.818 PREOP TESTING: Primary | ICD-10-CM

## 2024-07-09 LAB
ATRIAL RATE: 68 BPM
P AXIS: 43 DEGREES
P OFFSET: 193 MS
P ONSET: 137 MS
PR INTERVAL: 156 MS
Q ONSET: 215 MS
QRS COUNT: 12 BEATS
QRS DURATION: 108 MS
QT INTERVAL: 396 MS
QTC CALCULATION(BAZETT): 421 MS
QTC FREDERICIA: 413 MS
R AXIS: 11 DEGREES
T AXIS: 19 DEGREES
T OFFSET: 413 MS
VENTRICULAR RATE: 68 BPM

## 2024-07-09 PROCEDURE — 87186 SC STD MICRODIL/AGAR DIL: CPT

## 2024-07-09 PROCEDURE — 93005 ELECTROCARDIOGRAM TRACING: CPT

## 2024-07-09 PROCEDURE — 71046 X-RAY EXAM CHEST 2 VIEWS: CPT

## 2024-07-09 PROCEDURE — 87086 URINE CULTURE/COLONY COUNT: CPT

## 2024-07-09 ASSESSMENT — DUKE ACTIVITY SCORE INDEX (DASI)
CAN YOU WALK INDOORS, SUCH AS AROUND YOUR HOUSE: YES
CAN YOU WALK A BLOCK OR TWO ON LEVEL GROUND: YES
CAN YOU DO HEAVY WORK AROUND THE HOUSE LIKE SCRUBBING FLOORS OR LIFTING AND MOVING HEAVY FURNITURE: YES
CAN YOU TAKE CARE OF YOURSELF (EAT, DRESS, BATHE, OR USE TOILET): YES
DASI METS SCORE: 9.9
CAN YOU HAVE SEXUAL RELATIONS: YES
CAN YOU CLIMB A FLIGHT OF STAIRS OR WALK UP A HILL: YES
CAN YOU DO LIGHT WORK AROUND THE HOUSE LIKE DUSTING OR WASHING DISHES: YES
CAN YOU DO MODERATE WORK AROUND THE HOUSE LIKE VACUUMING, SWEEPING FLOORS OR CARRYING GROCERIES: YES
CAN YOU RUN A SHORT DISTANCE: YES
CAN YOU DO YARD WORK LIKE RAKING LEAVES, WEEDING OR PUSHING A MOWER: YES
CAN YOU PARTICIPATE IN MODERATE RECREATIONAL ACTIVITIES LIKE GOLF, BOWLING, DANCING, DOUBLES TENNIS OR THROWING A BASEBALL OR FOOTBALL: YES
TOTAL_SCORE: 58.2
CAN YOU PARTICIPATE IN STRENOUS SPORTS LIKE SWIMMING, SINGLES TENNIS, FOOTBALL, BASKETBALL, OR SKIING: YES

## 2024-07-09 NOTE — PREPROCEDURE INSTRUCTIONS
Medication List            Accurate as of July 9, 2024  1:46 PM. Always use your most recent med list.                tamsulosin 0.4 mg 24 hr capsule  Commonly known as: Flomax  Take 1 capsule (0.4 mg) by mouth once daily.  Medication Adjustments for Surgery: Take morning of surgery with sip of water, no other fluids                     NPO Instructions:    NO SOLID FOOD OR SMOKING after midnight the day before your surgery.  You may have clear liquids only until 3 hours prior to surgery.  No gum, candy, or mints in the morning.       Additional Instructions:      Review your medication instructions, take indicated medications.  Wear comfortable, loose fitting clothing.  All jewelry and valuables should be left at home.     Park in back of hospital by ER. Come up to Second floor-OUTPATIENT dept to check-in.  Bring Photo ID and Insurance card.  You MUST have an adult  with you. NO DRIVING FOR 24 HOURS AFTER SURGERY.     If you get ill at all before your procedure- CALL YOUR DOCTOR/SURGEON.  We want you in the best shape that is possible. Any sickness might lead to your procedure being delayed.       Call Outpatient dept at [phone] between 1-3pm the day before your procedure (Friday for Monday procedure), for your arrival time.                                             normal...

## 2024-07-12 DIAGNOSIS — N39.0 URINARY TRACT INFECTION WITHOUT HEMATURIA, SITE UNSPECIFIED: ICD-10-CM

## 2024-07-12 LAB — BACTERIA UR CULT: ABNORMAL

## 2024-07-12 RX ORDER — CIPROFLOXACIN 500 MG/1
500 TABLET ORAL 2 TIMES DAILY
Qty: 20 TABLET | Refills: 0 | Status: SHIPPED | OUTPATIENT
Start: 2024-07-12 | End: 2024-07-19 | Stop reason: HOSPADM

## 2024-07-19 ENCOUNTER — HOSPITAL ENCOUNTER (OUTPATIENT)
Facility: HOSPITAL | Age: 44
Setting detail: OUTPATIENT SURGERY
Discharge: HOME | End: 2024-07-19
Attending: STUDENT IN AN ORGANIZED HEALTH CARE EDUCATION/TRAINING PROGRAM | Admitting: STUDENT IN AN ORGANIZED HEALTH CARE EDUCATION/TRAINING PROGRAM
Payer: COMMERCIAL

## 2024-07-19 ENCOUNTER — APPOINTMENT (OUTPATIENT)
Dept: RADIOLOGY | Facility: HOSPITAL | Age: 44
End: 2024-07-19
Payer: COMMERCIAL

## 2024-07-19 ENCOUNTER — ANESTHESIA (OUTPATIENT)
Dept: OPERATING ROOM | Facility: HOSPITAL | Age: 44
End: 2024-07-19
Payer: COMMERCIAL

## 2024-07-19 VITALS
HEIGHT: 68 IN | BODY MASS INDEX: 29.55 KG/M2 | DIASTOLIC BLOOD PRESSURE: 71 MMHG | RESPIRATION RATE: 18 BRPM | WEIGHT: 195 LBS | TEMPERATURE: 98.1 F | OXYGEN SATURATION: 96 % | HEART RATE: 70 BPM | SYSTOLIC BLOOD PRESSURE: 118 MMHG

## 2024-07-19 DIAGNOSIS — N30.00 ACUTE CYSTITIS WITHOUT HEMATURIA: ICD-10-CM

## 2024-07-19 DIAGNOSIS — N20.1 LEFT URETERAL STONE: Primary | ICD-10-CM

## 2024-07-19 PROBLEM — K76.0 FATTY LIVER: Status: ACTIVE | Noted: 2024-07-19

## 2024-07-19 PROCEDURE — 2780000003 HC OR 278 NO HCPCS: Performed by: STUDENT IN AN ORGANIZED HEALTH CARE EDUCATION/TRAINING PROGRAM

## 2024-07-19 PROCEDURE — 7100000010 HC PHASE TWO TIME - EACH INCREMENTAL 1 MINUTE: Performed by: STUDENT IN AN ORGANIZED HEALTH CARE EDUCATION/TRAINING PROGRAM

## 2024-07-19 PROCEDURE — 2720000007 HC OR 272 NO HCPCS: Performed by: STUDENT IN AN ORGANIZED HEALTH CARE EDUCATION/TRAINING PROGRAM

## 2024-07-19 PROCEDURE — 7100000009 HC PHASE TWO TIME - INITIAL BASE CHARGE: Performed by: STUDENT IN AN ORGANIZED HEALTH CARE EDUCATION/TRAINING PROGRAM

## 2024-07-19 PROCEDURE — 82365 CALCULUS SPECTROSCOPY: CPT | Performed by: STUDENT IN AN ORGANIZED HEALTH CARE EDUCATION/TRAINING PROGRAM

## 2024-07-19 PROCEDURE — 52356 CYSTO/URETERO W/LITHOTRIPSY: CPT | Performed by: STUDENT IN AN ORGANIZED HEALTH CARE EDUCATION/TRAINING PROGRAM

## 2024-07-19 PROCEDURE — 76000 FLUOROSCOPY <1 HR PHYS/QHP: CPT | Mod: 59

## 2024-07-19 PROCEDURE — 3700000002 HC GENERAL ANESTHESIA TIME - EACH INCREMENTAL 1 MINUTE: Performed by: STUDENT IN AN ORGANIZED HEALTH CARE EDUCATION/TRAINING PROGRAM

## 2024-07-19 PROCEDURE — 2500000004 HC RX 250 GENERAL PHARMACY W/ HCPCS (ALT 636 FOR OP/ED): Performed by: STUDENT IN AN ORGANIZED HEALTH CARE EDUCATION/TRAINING PROGRAM

## 2024-07-19 PROCEDURE — 3600000008 HC OR TIME - EACH INCREMENTAL 1 MINUTE - PROCEDURE LEVEL THREE: Performed by: STUDENT IN AN ORGANIZED HEALTH CARE EDUCATION/TRAINING PROGRAM

## 2024-07-19 PROCEDURE — C2617 STENT, NON-COR, TEM W/O DEL: HCPCS | Performed by: STUDENT IN AN ORGANIZED HEALTH CARE EDUCATION/TRAINING PROGRAM

## 2024-07-19 PROCEDURE — 7100000002 HC RECOVERY ROOM TIME - EACH INCREMENTAL 1 MINUTE: Performed by: STUDENT IN AN ORGANIZED HEALTH CARE EDUCATION/TRAINING PROGRAM

## 2024-07-19 PROCEDURE — 2500000004 HC RX 250 GENERAL PHARMACY W/ HCPCS (ALT 636 FOR OP/ED)

## 2024-07-19 PROCEDURE — 2500000004 HC RX 250 GENERAL PHARMACY W/ HCPCS (ALT 636 FOR OP/ED): Performed by: NURSE ANESTHETIST, CERTIFIED REGISTERED

## 2024-07-19 PROCEDURE — 2500000005 HC RX 250 GENERAL PHARMACY W/O HCPCS: Performed by: NURSE ANESTHETIST, CERTIFIED REGISTERED

## 2024-07-19 PROCEDURE — C1769 GUIDE WIRE: HCPCS | Performed by: STUDENT IN AN ORGANIZED HEALTH CARE EDUCATION/TRAINING PROGRAM

## 2024-07-19 PROCEDURE — 3600000003 HC OR TIME - INITIAL BASE CHARGE - PROCEDURE LEVEL THREE: Performed by: STUDENT IN AN ORGANIZED HEALTH CARE EDUCATION/TRAINING PROGRAM

## 2024-07-19 PROCEDURE — 7100000001 HC RECOVERY ROOM TIME - INITIAL BASE CHARGE: Performed by: STUDENT IN AN ORGANIZED HEALTH CARE EDUCATION/TRAINING PROGRAM

## 2024-07-19 PROCEDURE — 3700000001 HC GENERAL ANESTHESIA TIME - INITIAL BASE CHARGE: Performed by: STUDENT IN AN ORGANIZED HEALTH CARE EDUCATION/TRAINING PROGRAM

## 2024-07-19 PROCEDURE — 74420 UROGRAPHY RTRGR +-KUB: CPT | Performed by: STUDENT IN AN ORGANIZED HEALTH CARE EDUCATION/TRAINING PROGRAM

## 2024-07-19 PROCEDURE — 2550000001 HC RX 255 CONTRASTS: Performed by: STUDENT IN AN ORGANIZED HEALTH CARE EDUCATION/TRAINING PROGRAM

## 2024-07-19 DEVICE — STENT KIT, IMAJIN HYDRO, 6FR X 26CM, POSITIONER, NO GUIDEWIRE: Type: IMPLANTABLE DEVICE | Site: URETER | Status: FUNCTIONAL

## 2024-07-19 RX ORDER — PHENAZOPYRIDINE HYDROCHLORIDE 200 MG/1
200 TABLET, FILM COATED ORAL 3 TIMES DAILY PRN
Qty: 15 TABLET | Refills: 0 | Status: SHIPPED | OUTPATIENT
Start: 2024-07-19

## 2024-07-19 RX ORDER — GENTAMICIN 40 MG/ML
INJECTION, SOLUTION INTRAMUSCULAR; INTRAVENOUS AS NEEDED
Status: DISCONTINUED | OUTPATIENT
Start: 2024-07-19 | End: 2024-07-19

## 2024-07-19 RX ORDER — ONDANSETRON HYDROCHLORIDE 2 MG/ML
INJECTION, SOLUTION INTRAVENOUS AS NEEDED
Status: DISCONTINUED | OUTPATIENT
Start: 2024-07-19 | End: 2024-07-19

## 2024-07-19 RX ORDER — CEFAZOLIN SODIUM 2 G/50ML
2 SOLUTION INTRAVENOUS ONCE
Status: COMPLETED | OUTPATIENT
Start: 2024-07-19 | End: 2024-07-19

## 2024-07-19 RX ORDER — OXYCODONE HYDROCHLORIDE 5 MG/1
5 TABLET ORAL EVERY 4 HOURS PRN
Status: DISCONTINUED | OUTPATIENT
Start: 2024-07-19 | End: 2024-07-19 | Stop reason: HOSPADM

## 2024-07-19 RX ORDER — ACETAMINOPHEN 325 MG/1
650 TABLET ORAL EVERY 4 HOURS PRN
Status: DISCONTINUED | OUTPATIENT
Start: 2024-07-19 | End: 2024-07-19 | Stop reason: HOSPADM

## 2024-07-19 RX ORDER — LIDOCAINE HYDROCHLORIDE 20 MG/ML
INJECTION, SOLUTION INFILTRATION; PERINEURAL AS NEEDED
Status: DISCONTINUED | OUTPATIENT
Start: 2024-07-19 | End: 2024-07-19

## 2024-07-19 RX ORDER — PROPOFOL 10 MG/ML
INJECTION, EMULSION INTRAVENOUS AS NEEDED
Status: DISCONTINUED | OUTPATIENT
Start: 2024-07-19 | End: 2024-07-19

## 2024-07-19 RX ORDER — CIPROFLOXACIN 500 MG/1
500 TABLET ORAL 2 TIMES DAILY
Qty: 10 TABLET | Refills: 0 | Status: SHIPPED | OUTPATIENT
Start: 2024-07-19 | End: 2024-07-24

## 2024-07-19 RX ORDER — IBUPROFEN 600 MG/1
600 TABLET ORAL EVERY 6 HOURS PRN
Qty: 28 TABLET | Refills: 0 | Status: SHIPPED | OUTPATIENT
Start: 2024-07-19

## 2024-07-19 RX ORDER — MIDAZOLAM HYDROCHLORIDE 1 MG/ML
INJECTION INTRAMUSCULAR; INTRAVENOUS AS NEEDED
Status: DISCONTINUED | OUTPATIENT
Start: 2024-07-19 | End: 2024-07-19

## 2024-07-19 RX ORDER — ONDANSETRON HYDROCHLORIDE 2 MG/ML
4 INJECTION, SOLUTION INTRAVENOUS ONCE AS NEEDED
Status: DISCONTINUED | OUTPATIENT
Start: 2024-07-19 | End: 2024-07-19 | Stop reason: HOSPADM

## 2024-07-19 RX ORDER — SODIUM CHLORIDE, SODIUM LACTATE, POTASSIUM CHLORIDE, CALCIUM CHLORIDE 600; 310; 30; 20 MG/100ML; MG/100ML; MG/100ML; MG/100ML
30 INJECTION, SOLUTION INTRAVENOUS CONTINUOUS
Status: DISCONTINUED | OUTPATIENT
Start: 2024-07-19 | End: 2024-07-19 | Stop reason: HOSPADM

## 2024-07-19 RX ORDER — KETOROLAC TROMETHAMINE 30 MG/ML
INJECTION, SOLUTION INTRAMUSCULAR; INTRAVENOUS AS NEEDED
Status: DISCONTINUED | OUTPATIENT
Start: 2024-07-19 | End: 2024-07-19

## 2024-07-19 RX ORDER — HYDROMORPHONE HYDROCHLORIDE 1 MG/ML
1 INJECTION, SOLUTION INTRAMUSCULAR; INTRAVENOUS; SUBCUTANEOUS EVERY 5 MIN PRN
Status: DISCONTINUED | OUTPATIENT
Start: 2024-07-19 | End: 2024-07-19 | Stop reason: HOSPADM

## 2024-07-19 RX ORDER — SODIUM CHLORIDE, SODIUM LACTATE, POTASSIUM CHLORIDE, CALCIUM CHLORIDE 600; 310; 30; 20 MG/100ML; MG/100ML; MG/100ML; MG/100ML
100 INJECTION, SOLUTION INTRAVENOUS CONTINUOUS
Status: DISCONTINUED | OUTPATIENT
Start: 2024-07-19 | End: 2024-07-19 | Stop reason: HOSPADM

## 2024-07-19 RX ORDER — FENTANYL CITRATE 50 UG/ML
INJECTION, SOLUTION INTRAMUSCULAR; INTRAVENOUS AS NEEDED
Status: DISCONTINUED | OUTPATIENT
Start: 2024-07-19 | End: 2024-07-19

## 2024-07-19 SDOH — HEALTH STABILITY: MENTAL HEALTH: CURRENT SMOKER: 0

## 2024-07-19 ASSESSMENT — PAIN SCALES - GENERAL
PAINLEVEL_OUTOF10: 0 - NO PAIN
PAIN_LEVEL: 0
PAINLEVEL_OUTOF10: 0 - NO PAIN

## 2024-07-19 ASSESSMENT — ENCOUNTER SYMPTOMS
FEVER: 0
CHILLS: 0
DYSURIA: 0
HEMATURIA: 0
FLANK PAIN: 0
ABDOMINAL PAIN: 0
SHORTNESS OF BREATH: 0

## 2024-07-19 ASSESSMENT — PAIN - FUNCTIONAL ASSESSMENT
PAIN_FUNCTIONAL_ASSESSMENT: 0-10

## 2024-07-19 ASSESSMENT — COLUMBIA-SUICIDE SEVERITY RATING SCALE - C-SSRS
2. HAVE YOU ACTUALLY HAD ANY THOUGHTS OF KILLING YOURSELF?: NO
1. IN THE PAST MONTH, HAVE YOU WISHED YOU WERE DEAD OR WISHED YOU COULD GO TO SLEEP AND NOT WAKE UP?: NO
6. HAVE YOU EVER DONE ANYTHING, STARTED TO DO ANYTHING, OR PREPARED TO DO ANYTHING TO END YOUR LIFE?: NO

## 2024-07-19 NOTE — H&P (VIEW-ONLY)
History Of Present Illness  Mikael Rodríguez is a 43 y.o. male presenting for left cystoscopy laser lithotripsy, part 2 of a staged procedure. He presented to ED on 6/11/24 CT showed 11 mm left UVJ obstructing calculus with mild hydroureteronephrosis and 3 mm proximal right ureteral calculus without hydronephrosis.He had cystoscopy with stent placement 6/12/24. He returned today for procedure. [place]-speaking. Alicia-  present. He presented to ED 7/2 with headaches, body aches, and dizziness. He states these sx have resolved, workup was negative. Urine culture from 7/9 showed UTI, took the ciprofloxacin as prescribed.  He denies new issues or questions and is in agreement with surgical plan today.       Past Medical History  Past Medical History:   Diagnosis Date    Hepatic steatosis 06/11/2024    Diffuse    Hydronephrosis of left kidney 06/11/2024    Mild    Left ureteral stone 06/11/2024    11 mm left UVJ obstructing calculus    Syncope        Surgical History  Past Surgical History:   Procedure Laterality Date    APPENDECTOMY      PENIS SURGERY      as a child        Social History  He reports that he has quit smoking. His smoking use included cigarettes. He started smoking about 5 years ago. He has a 1.3 pack-year smoking history. He has never used smokeless tobacco. He reports that he does not currently use alcohol after a past usage of about 4.0 standard drinks of alcohol per week. He reports that he does not use drugs.    Family History  Family History   Problem Relation Name Age of Onset    Diabetes Other      Stomach cancer Other          Allergies  Patient has no known allergies.    Review of Systems   Constitutional:  Negative for chills and fever.   Respiratory:  Negative for shortness of breath.    Cardiovascular:  Negative for chest pain.   Gastrointestinal:  Negative for abdominal pain.   Genitourinary:  Negative for dysuria, flank pain and hematuria.   All other systems reviewed  "and are negative.       Physical Exam  Constitutional:       General: He is not in acute distress.     Appearance: Normal appearance.   HENT:      Head: Normocephalic.   Eyes:      Extraocular Movements: Extraocular movements intact.      Conjunctiva/sclera: Conjunctivae normal.   Cardiovascular:      Pulses: Normal pulses.   Pulmonary:      Effort: Pulmonary effort is normal.   Abdominal:      Palpations: Abdomen is soft.   Musculoskeletal:         General: Normal range of motion.      Cervical back: Neck supple.   Skin:     General: Skin is warm and dry.   Neurological:      General: No focal deficit present.      Mental Status: He is alert.   Psychiatric:         Mood and Affect: Mood normal.          Last Recorded Vitals  Blood pressure 132/74, pulse 85, temperature 36.3 °C (97.3 °F), temperature source Temporal, resp. rate 19, height 1.727 m (5' 8\"), weight 88.5 kg (195 lb), SpO2 98%.       Assessment/Plan   Principal Problem:    Left ureteral stone      Cystoscopy with lithotripsy  Retrograde pyelogram  Possible stent exchange       I spent 20 minutes in the professional and overall care of this patient.      Cole Marquez PA-C    "

## 2024-07-19 NOTE — H&P
History Of Present Illness  Mikael Rodríguez is a 43 y.o. male presenting for left cystoscopy laser lithotripsy, part 2 of a staged procedure. He presented to ED on 6/11/24 CT showed 11 mm left UVJ obstructing calculus with mild hydroureteronephrosis and 3 mm proximal right ureteral calculus without hydronephrosis.He had cystoscopy with stent placement 6/12/24. He returned today for procedure. [place] citizen-speaking. Alicia-  present. He presented to ED 7/2 with headaches, body aches, and dizziness. He states these sx have resolved, workup was negative. Urine culture from 7/9 showed UTI, took the ciprofloxacin as prescribed.  He denies new issues or questions and is in agreement with surgical plan today.       Past Medical History  Past Medical History:   Diagnosis Date    Hepatic steatosis 06/11/2024    Diffuse    Hydronephrosis of left kidney 06/11/2024    Mild    Left ureteral stone 06/11/2024    11 mm left UVJ obstructing calculus    Syncope        Surgical History  Past Surgical History:   Procedure Laterality Date    APPENDECTOMY      PENIS SURGERY      as a child        Social History  He reports that he has quit smoking. His smoking use included cigarettes. He started smoking about 5 years ago. He has a 1.3 pack-year smoking history. He has never used smokeless tobacco. He reports that he does not currently use alcohol after a past usage of about 4.0 standard drinks of alcohol per week. He reports that he does not use drugs.    Family History  Family History   Problem Relation Name Age of Onset    Diabetes Other      Stomach cancer Other          Allergies  Patient has no known allergies.    Review of Systems   Constitutional:  Negative for chills and fever.   Respiratory:  Negative for shortness of breath.    Cardiovascular:  Negative for chest pain.   Gastrointestinal:  Negative for abdominal pain.   Genitourinary:  Negative for dysuria, flank pain and hematuria.   All other systems reviewed  "and are negative.       Physical Exam  Constitutional:       General: He is not in acute distress.     Appearance: Normal appearance.   HENT:      Head: Normocephalic.   Eyes:      Extraocular Movements: Extraocular movements intact.      Conjunctiva/sclera: Conjunctivae normal.   Cardiovascular:      Pulses: Normal pulses.   Pulmonary:      Effort: Pulmonary effort is normal.   Abdominal:      Palpations: Abdomen is soft.   Musculoskeletal:         General: Normal range of motion.      Cervical back: Neck supple.   Skin:     General: Skin is warm and dry.   Neurological:      General: No focal deficit present.      Mental Status: He is alert.   Psychiatric:         Mood and Affect: Mood normal.          Last Recorded Vitals  Blood pressure 132/74, pulse 85, temperature 36.3 °C (97.3 °F), temperature source Temporal, resp. rate 19, height 1.727 m (5' 8\"), weight 88.5 kg (195 lb), SpO2 98%.       Assessment/Plan   Principal Problem:    Left ureteral stone      Cystoscopy with lithotripsy  Retrograde pyelogram  Possible stent exchange       I spent 20 minutes in the professional and overall care of this patient.      Cole Marquez PA-C    "

## 2024-07-19 NOTE — OP NOTE
Ureteroscopy with Lithotripsy Laser, Double J stent exchange, retrograde pyelogram (L) Operative Note     Date: 2024  OR Location: [facility] OR    Name: Mikael Rodríguez, : 1980, Age: 43 y.o., MRN: [id number], Sex: male    Diagnosis  Pre-op Diagnosis      * Left ureteral stone [N20.1] Post-op Diagnosis     * Left ureteral stone [N20.1]     Procedures  Ureteroscopy with Lithotripsy Laser, Double J stent exchange, retrograde pyelogram  59374 - TX CYSTO/URETERO W/LITHOTRIPSY &INDWELL STENT INSRT    CHG UROGRAPHY RETROGRADE WITH/WO KUB [21056]  Surgeons      * Sherwin Segal - Primary    Resident/Fellow/Other Assistant:  Surgeons and Role:  * No surgeons found with a matching role *    Procedure Summary  Anesthesia: General  ASA: II  Anesthesia Staff: CRNA: TERRI Ennis-CRNA  Estimated Blood Loss: 0mL  Intra-op Medications: Administrations occurring from 1445 to 1550 on 24:  * No intraprocedure medications in log *           Anesthesia Record               Intraprocedure I/O Totals          Intake    Propofol Drip 0.00 mL    The total shown is the total volume documented since Anesthesia Start was filed.    lactated Ringer's infusion 600.00 mL    ceFAZolin (Ancef) 2 g in dextrose (iso) IV 50 mL 50.00 mL    Total Intake 650 mL          Specimen: No specimens collected     Staff:   Circulator: Hussain Wynne Person: Felicia  Circulator: Merna         Drains and/or Catheters: * None in log *    Tourniquet Times:         Implants:  Implants       Type Name Action Serial No.      Implant STENT KIT, IMAJIN HYDRO, 6FR X 26CM, POSITIONER, NO GUIDEWIRE - [id number] Implanted               Findings: 1cm stone in distal left ureter completely fragmented    Indications: Mikael Rodríguez is an 43 y.o. male who is having surgery for Left ureteral stone [N20.1]. Prior presentation with infected left ureter stone for which stent was placed and stone management deferred.    The patient was seen  in the preoperative area. The risks, benefits, complications, treatment options, non-operative alternatives, expected recovery and outcomes were discussed with the patient. The possibilities of reaction to medication, pulmonary aspiration, injury to surrounding structures, bleeding, recurrent infection, the need for additional procedures, failure to diagnose a condition, and creating a complication requiring transfusion or operation were discussed with the patient. The patient concurred with the proposed plan, giving informed consent.  The site of surgery was properly noted/marked if necessary per policy. The patient has been actively warmed in preoperative area. Preoperative antibiotics have been ordered and given within 1 hours of incision. Venous thrombosis prophylaxis have been ordered including bilateral sequential compression devices    Procedure Details: Patient was consented and antibiotics were started on call to OR.  In the operating room, under general anesthesia, with the patient in dorsolithotomy position, genitalia was prepped and draped in the usual manner.  #22 cystoscope was inserted down the urethra into the bladder, and cystoscopy revealed no stones or tumors. The ureteral orifices were identified in the normal orthotopic position with stent arising from the left ureteral orifice.  The stent forceps was used to pull out the stent to the urethral meatus, an Ultra-track wire was advanced through the old stent into the left ureter, and stent was pulled out.  The ureteral catheter and the cystoscope were removed and the rigid ureteroscope was inserted parallel to the wire into the left ureter, and advanced to the level of the distal-ureter where the stone was seen.  Using the 365 µm laser fiber, the stone was fragmented into small pieces, which were falling out into the bladder.    Contrast was injected through the ureteroscope visualizing the ureter and the renal pelvis.  There was significant  hydroureter and mild residual hydronephrosis.    Then the cystoscope was reinserted over the wire and a 6-26 double-J stent was advanced over the wire, with the proximal curl of the stent visualized in the renal pelvis topography using fluoroscopy, while the distal curl was visualized in the bladder.    The bladder was emptied and the stone fragments were sent for qualitative analysis.  This concluded the procedure.    Patient tolerated the procedure well and was transferred to PACU in stable condition.    Complications:  None; patient tolerated the procedure well.    Disposition: PACU - hemodynamically stable.  Condition: stable         Additional Details:     Attending Attestation: I performed the procedure.    Sherwin Segal  Phone Number: [phone]

## 2024-07-19 NOTE — ANESTHESIA PROCEDURE NOTES
Airway  Date/Time: 7/19/2024 4:56 PM  Urgency: elective    Airway not difficult    Staffing  Performed: CRNA   Authorized by: MARTINE Ennis    Performed by: MARTINE Ennis  Patient location during procedure: OR    Indications and Patient Condition  Indications for airway management: anesthesia  Spontaneous Ventilation: absent  Sedation level: deep  Preoxygenated: yes  Patient position: sniffing  MILS maintained throughout  Mask difficulty assessment: 0 - not attempted    Final Airway Details  Final airway type: supraglottic airway      Successful airway: Size 4     Number of attempts at approach: 1  Number of other approaches attempted: 0    Additional Comments  IGEL

## 2024-07-19 NOTE — ANESTHESIA PREPROCEDURE EVALUATION
Patient: iMkael Rodríguez    Procedure Information       Date/Time: 07/19/24 1445    Procedure: Cystoscopy with Lithotripsy Laser([place] notified conf#[id number] w/Steven) (Left)    Location: [facility] / [facility]    Surgeons: Sherwin Segal MD            Relevant Problems   Anesthesia (within normal limits)      Cardiac (within normal limits)      Pulmonary (within normal limits)      Neuro (within normal limits)      GI (within normal limits)      /Renal   (+) Left nephrolithiasis      Liver   (+) Fatty liver      Endocrine (within normal limits)      Hematology (within normal limits)      Musculoskeletal (within normal limits)      HEENT (within normal limits)      ID (within normal limits)      Skin (within normal limits)      GYN (within normal limits)       Clinical information reviewed:   Tobacco  Allergies  Meds   Med Hx  Surg Hx   Fam Hx  Soc Hx        NPO Detail:  NPO/Void Status  Date of Last Liquid: 07/18/24  Time of Last Liquid: 2330  Date of Last Solid: 07/18/24  Time of Last Solid: 2330         Physical Exam    Airway  Mallampati: II     Cardiovascular - normal exam     Dental - normal exam     Pulmonary - normal exam     Abdominal - normal exam             Anesthesia Plan    History of general anesthesia?: yes  History of complications of general anesthesia?: no    ASA 2     general     The patient is not a current smoker.  Patient did not smoke on day of procedure.  Education provided regarding risk of obstructive sleep apnea.  intravenous induction   Anesthetic plan and risks discussed with patient.

## 2024-07-19 NOTE — ANESTHESIA POSTPROCEDURE EVALUATION
Patient: Mikael Rodríguez    Procedure Summary       Date: 07/19/24 Room / Location: [facility] / [facility]    Anesthesia Start: 1652 Anesthesia Stop: 1746    Procedure: Ureteroscopy with Lithotripsy Laser, Double J stent exchange, retrograde pyelogram (Left) Diagnosis:       Left ureteral stone      (Left ureteral stone [N20.1])    Surgeons: Sherwin Segal MD Responsible Provider: MARTINE Ennis    Anesthesia Type: general ASA Status: 2            Anesthesia Type: general    Vitals Value Taken Time   /72 07/19/24 1742   Temp 36.5 °C (97.7 °F) 07/19/24 1737   Pulse 72 07/19/24 1742   Resp 17 07/19/24 1742   SpO2 95 % 07/19/24 1742       Anesthesia Post Evaluation    Patient location during evaluation: PACU  Patient participation: complete - patient participated  Level of consciousness: awake  Pain score: 0  Pain management: adequate  Multimodal analgesia pain management approach  Airway patency: patent  Two or more strategies used to mitigate risk of obstructive sleep apnea  Cardiovascular status: acceptable  Respiratory status: acceptable and room air  Hydration status: acceptable  Postoperative Nausea and Vomiting: none        No notable events documented.

## 2024-07-19 NOTE — DISCHARGE INSTRUCTIONS
Take ibuprofen 600 mg for pain every 6 hours. Take 650 mg acetaminophen every 8 hours as needed for pain.

## 2024-07-23 LAB
APPEARANCE STONE: NORMAL
COMPN STONE: NORMAL
SPECIMEN WT: 4 MG

## 2024-07-26 DIAGNOSIS — N20.0 LEFT NEPHROLITHIASIS: Primary | ICD-10-CM

## 2024-08-02 ENCOUNTER — HOSPITAL ENCOUNTER (OUTPATIENT)
Facility: HOSPITAL | Age: 44
Setting detail: OUTPATIENT SURGERY
Discharge: HOME | End: 2024-08-02
Attending: STUDENT IN AN ORGANIZED HEALTH CARE EDUCATION/TRAINING PROGRAM | Admitting: STUDENT IN AN ORGANIZED HEALTH CARE EDUCATION/TRAINING PROGRAM
Payer: COMMERCIAL

## 2024-08-02 VITALS
RESPIRATION RATE: 17 BRPM | OXYGEN SATURATION: 99 % | SYSTOLIC BLOOD PRESSURE: 145 MMHG | DIASTOLIC BLOOD PRESSURE: 104 MMHG | HEART RATE: 58 BPM | HEIGHT: 68 IN | WEIGHT: 194 LBS | BODY MASS INDEX: 29.4 KG/M2 | TEMPERATURE: 97.9 F

## 2024-08-02 DIAGNOSIS — Z96.0 URETERAL STENT PRESENT: ICD-10-CM

## 2024-08-02 DIAGNOSIS — N20.1 LEFT URETERAL STONE: Primary | ICD-10-CM

## 2024-08-02 PROCEDURE — 7100000010 HC PHASE TWO TIME - EACH INCREMENTAL 1 MINUTE: Performed by: STUDENT IN AN ORGANIZED HEALTH CARE EDUCATION/TRAINING PROGRAM

## 2024-08-02 PROCEDURE — 2720000007 HC OR 272 NO HCPCS: Performed by: STUDENT IN AN ORGANIZED HEALTH CARE EDUCATION/TRAINING PROGRAM

## 2024-08-02 PROCEDURE — 3600000008 HC OR TIME - EACH INCREMENTAL 1 MINUTE - PROCEDURE LEVEL THREE: Performed by: STUDENT IN AN ORGANIZED HEALTH CARE EDUCATION/TRAINING PROGRAM

## 2024-08-02 PROCEDURE — 3600000003 HC OR TIME - INITIAL BASE CHARGE - PROCEDURE LEVEL THREE: Performed by: STUDENT IN AN ORGANIZED HEALTH CARE EDUCATION/TRAINING PROGRAM

## 2024-08-02 PROCEDURE — 2500000005 HC RX 250 GENERAL PHARMACY W/O HCPCS: Performed by: STUDENT IN AN ORGANIZED HEALTH CARE EDUCATION/TRAINING PROGRAM

## 2024-08-02 PROCEDURE — 7100000009 HC PHASE TWO TIME - INITIAL BASE CHARGE: Performed by: STUDENT IN AN ORGANIZED HEALTH CARE EDUCATION/TRAINING PROGRAM

## 2024-08-02 PROCEDURE — 52310 CYSTOSCOPY AND TREATMENT: CPT | Performed by: STUDENT IN AN ORGANIZED HEALTH CARE EDUCATION/TRAINING PROGRAM

## 2024-08-02 PROCEDURE — 2500000001 HC RX 250 WO HCPCS SELF ADMINISTERED DRUGS (ALT 637 FOR MEDICARE OP): Performed by: PHYSICIAN ASSISTANT

## 2024-08-02 RX ORDER — CELECOXIB 100 MG/1
200 CAPSULE ORAL ONCE
Status: COMPLETED | OUTPATIENT
Start: 2024-08-02 | End: 2024-08-02

## 2024-08-02 RX ORDER — ORPHENADRINE CITRATE 100 MG/1
100 TABLET, EXTENDED RELEASE ORAL 2 TIMES DAILY PRN
COMMUNITY
Start: 2024-07-02

## 2024-08-02 RX ORDER — ACETAMINOPHEN 325 MG/1
975 TABLET ORAL ONCE
Status: COMPLETED | OUTPATIENT
Start: 2024-08-02 | End: 2024-08-02

## 2024-08-02 RX ORDER — SODIUM CHLORIDE, SODIUM LACTATE, POTASSIUM CHLORIDE, CALCIUM CHLORIDE 600; 310; 30; 20 MG/100ML; MG/100ML; MG/100ML; MG/100ML
30 INJECTION, SOLUTION INTRAVENOUS CONTINUOUS
Status: DISCONTINUED | OUTPATIENT
Start: 2024-08-02 | End: 2024-08-02 | Stop reason: HOSPADM

## 2024-08-02 RX ORDER — LIDOCAINE HYDROCHLORIDE 20 MG/ML
JELLY TOPICAL AS NEEDED
Status: DISCONTINUED | OUTPATIENT
Start: 2024-08-02 | End: 2024-08-02 | Stop reason: HOSPADM

## 2024-08-02 RX ORDER — PHENAZOPYRIDINE HYDROCHLORIDE 100 MG/1
200 TABLET, FILM COATED ORAL ONCE
Status: COMPLETED | OUTPATIENT
Start: 2024-08-02 | End: 2024-08-02

## 2024-08-02 RX ORDER — CEFAZOLIN SODIUM 2 G/50ML
2 SOLUTION INTRAVENOUS ONCE
Status: DISCONTINUED | OUTPATIENT
Start: 2024-08-02 | End: 2024-08-02

## 2024-08-02 RX ORDER — SODIUM CHLORIDE, SODIUM LACTATE, POTASSIUM CHLORIDE, CALCIUM CHLORIDE 600; 310; 30; 20 MG/100ML; MG/100ML; MG/100ML; MG/100ML
100 INJECTION, SOLUTION INTRAVENOUS CONTINUOUS
Status: DISCONTINUED | OUTPATIENT
Start: 2024-08-02 | End: 2024-08-02 | Stop reason: HOSPADM

## 2024-08-02 RX ORDER — OXYCODONE HYDROCHLORIDE 5 MG/1
10 TABLET ORAL EVERY 4 HOURS PRN
Status: DISCONTINUED | OUTPATIENT
Start: 2024-08-02 | End: 2024-08-02 | Stop reason: HOSPADM

## 2024-08-02 ASSESSMENT — PAIN SCALES - GENERAL
PAINLEVEL_OUTOF10: 0 - NO PAIN

## 2024-08-02 ASSESSMENT — PAIN - FUNCTIONAL ASSESSMENT
PAIN_FUNCTIONAL_ASSESSMENT: 0-10

## 2024-08-02 NOTE — INTERVAL H&P NOTE
H&P reviewed. The patient was examined and there are no changes to the H&P.    No new pain or hematuria. Denies fevers, chills.  through [facility] used.

## 2024-08-02 NOTE — DISCHARGE INSTRUCTIONS
Take OTC ibuprofen 600 mg every 6 hours and accetaminophen 650 mg every 6 hours, alternate so that every 3 hours you are taking medication, until pain is tolerable without. Do not take ibuprofen if you have impaired kidney function or history of GI bleeding. Do not take tylenol if you have impaired liver function.

## 2024-08-02 NOTE — OP NOTE
Cystoscopy with Removal Stent Ureter (L) Operative Note     Date: 2024  OR Location: [facility]    Name: Mikael Rodríguez, : 1980, Age: 43 y.o., MRN: [id number], Sex: male    Diagnosis  Pre-op Diagnosis      * Left ureteral stone [N20.1]     * Ureteral stent present [Z96.0] Post-op Diagnosis     * Left ureteral stone [N20.1]     * Ureteral stent present [Z96.0]     Procedures  Cystoscopy with Removal Stent Ureter  38783 - VT CYSTO W/SIMPLE REMOVAL STONE & STENT      Surgeons      * Sherwin Segal - Primary    Resident/Fellow/Other Assistant:  Surgeons and Role:  * No surgeons found with a matching role *    Procedure Summary  Anesthesia: Anesthesia type not filed in the log.  ASA: ASA status not filed in the log.  Anesthesia Staff: No anesthesia staff entered.  Estimated Blood Loss: 0mL  Intra-op Medications: Administrations occurring from 0730 to 0752 on 24:  * No intraprocedure medications in log *      Intraprocedure I/O Totals       None           Specimen: No specimens collected     Staff:   Circulator: Soco  Circulator: Hussain Wynne Person: Sade  Circulator: Jamari         Drains and/or Catheters: * None in log *    Tourniquet Times:         Implants:     Findings: tight coronal hypospadias    Indications: Mikael Rodríguez is an 43 y.o. male who is having surgery for Left nephrolithiasis [N20.0]. Left ureteroscopy performed 2 weeks ago, now presenting for stent removal    The patient was seen in the preoperative area. The risks, benefits, complications, treatment options, non-operative alternatives, expected recovery and outcomes were discussed with the patient. The possibilities of reaction to medication, pulmonary aspiration, injury to surrounding structures, bleeding, recurrent infection, the need for additional procedures, failure to diagnose a condition, and creating a complication requiring transfusion or operation were discussed with the patient. The patient concurred  with the proposed plan, giving informed consent.  The site of surgery was properly noted/marked if necessary per policy. The patient has been actively warmed in preoperative area. Preoperative antibiotics are not indicated. Venous thrombosis prophylaxis are not indicated.    Procedure Details: Patient was awake for the procedure.  He was placed in the dorsolithotomy position, genitalia scrubbed and draped in the usual manner.  Using 2% Xylocaine gel, 10 mL of gel were injected into the urethra.  #22 cystoscope was attempted to be inserted, however because of his hypospadias and narrow urethral meatus, we could not inserted, so a clamp was used for gentle dilation, followed by the blind obturator of the cystoscope.  Then we inserted the #2 cystoscope down the urethra and prostate into the bladder.  The stent was identified coming out of the left ureteral orifice.  Using the stent grasper, the stent was pulled out.  Patient tolerated the procedure was Rashed to PACU in stable condition.    Complications:  None; patient tolerated the procedure well.    Disposition: PACU - hemodynamically stable.  Condition: stable         Additional Details: no follow-up needed    Attending Attestation: I performed the procedure.    Sherwin Segal  Phone Number: [phone]

## 2024-08-06 LAB
ATRIAL RATE: 68 BPM
P AXIS: 43 DEGREES
P OFFSET: 193 MS
P ONSET: 137 MS
PR INTERVAL: 156 MS
Q ONSET: 215 MS
QRS COUNT: 12 BEATS
QRS DURATION: 108 MS
QT INTERVAL: 396 MS
QTC CALCULATION(BAZETT): 421 MS
QTC FREDERICIA: 413 MS
R AXIS: 11 DEGREES
T AXIS: 19 DEGREES
T OFFSET: 413 MS
VENTRICULAR RATE: 68 BPM

## (undated) DEVICE — COLLECTION BAG, FLUID, NON-STERILE

## (undated) DEVICE — GLOVE, SURGICAL, PROTEXIS PI BLUE W/NEUTHERA, 7.5, PF, LF

## (undated) DEVICE — SYRINGE, LUER LOCK, 12ML

## (undated) DEVICE — MARKER, SKIN, DUAL TIP INK W/9 LABEL AND REMOVABLE TIME OUT SLEEVE

## (undated) DEVICE — Device

## (undated) DEVICE — BAG, PRESSURE INFUSER, 3000 CC, LF

## (undated) DEVICE — TRAY, DRY PREP, PREMIUM

## (undated) DEVICE — IRRIGATION SET, CYSTOSCOPY, TURP, Y, CONTINUOUS, 81 IN

## (undated) DEVICE — PREP, SKIN, BETADINE, SOLUTION, 16 OZ

## (undated) DEVICE — TUBING, SUCTION, CONNECTING, NON-CONDUCTIVE, SURE GRIP CONNECTORS, 3/16 IN X 10 FT

## (undated) DEVICE — CATHETER, URETERAL, OPEN END, 5 FR, 70 CM

## (undated) DEVICE — GLOVE, SURGICAL, PROTEXIS PI , 7.5, PF, LF

## (undated) DEVICE — TOWEL PACK, STERILE, 4/PACK, BLUE

## (undated) DEVICE — GUIDEWIRE, ULTRA TRACK, HYBRID, 0.035 IN X 150CM

## (undated) DEVICE — STOPCOCK, SAN ANTONIO, W/MODIFIED FITTING

## (undated) DEVICE — SOLUTION, INJECTION, SODIUM CHLORIDE 9%, 3000ML

## (undated) DEVICE — SOLUTION, IRRIGATION, USP, SODIUM CHLORIDE 0.9%, 3000 ML, BAG

## (undated) DEVICE — DRAPE, C-ARM IMAGE

## (undated) DEVICE — PREP TRAY, SKIN, DRY, W/GLOVES

## (undated) DEVICE — CATHETER, URETERAL, POLLACK, OPEN END, 5.5 FR, 70 CM